# Patient Record
Sex: FEMALE | Race: WHITE | NOT HISPANIC OR LATINO | URBAN - METROPOLITAN AREA
[De-identification: names, ages, dates, MRNs, and addresses within clinical notes are randomized per-mention and may not be internally consistent; named-entity substitution may affect disease eponyms.]

---

## 2020-06-02 ENCOUNTER — TELEPHONE ENCOUNTER (OUTPATIENT)
Dept: URBAN - METROPOLITAN AREA CLINIC 80 | Facility: CLINIC | Age: 40
End: 2020-06-02

## 2020-06-03 ENCOUNTER — LAB OUTSIDE AN ENCOUNTER (OUTPATIENT)
Dept: URBAN - METROPOLITAN AREA TELEHEALTH 2 | Facility: TELEHEALTH | Age: 40
End: 2020-06-03

## 2020-06-03 ENCOUNTER — OFFICE VISIT (OUTPATIENT)
Dept: URBAN - METROPOLITAN AREA TELEHEALTH 2 | Facility: TELEHEALTH | Age: 40
End: 2020-06-03
Payer: MEDICARE

## 2020-06-03 ENCOUNTER — OFFICE VISIT (OUTPATIENT)
Dept: URBAN - METROPOLITAN AREA TELEHEALTH 2 | Facility: TELEHEALTH | Age: 40
End: 2020-06-03

## 2020-06-03 DIAGNOSIS — R10.9 ABDOMINAL PAIN: ICD-10-CM

## 2020-06-03 DIAGNOSIS — K29.70 GASTRITIS: ICD-10-CM

## 2020-06-03 DIAGNOSIS — K27.9 PUD (PEPTIC ULCER DISEASE): ICD-10-CM

## 2020-06-03 DIAGNOSIS — K21.9 GERD: ICD-10-CM

## 2020-06-03 DIAGNOSIS — K59.01 CONSTIPATION: ICD-10-CM

## 2020-06-03 PROCEDURE — G9902 PT SCRN TBCO AND ID AS USER: HCPCS | Performed by: INTERNAL MEDICINE

## 2020-06-03 PROCEDURE — 99214 OFFICE O/P EST MOD 30 MIN: CPT | Performed by: INTERNAL MEDICINE

## 2020-06-03 PROCEDURE — G8427 DOCREV CUR MEDS BY ELIG CLIN: HCPCS | Performed by: INTERNAL MEDICINE

## 2020-06-03 RX ORDER — PROMETHAZINE HYDROCHLORIDE 25 MG/ML
1 ML AS NEEDED INJECTION INTRAMUSCULAR; INTRAVENOUS
Qty: 60 | Refills: 2 | OUTPATIENT
Start: 2020-06-03 | End: 2020-09-01

## 2020-06-03 RX ORDER — ONDANSETRON HYDROCHLORIDE 4 MG/1
TAKE 1 TABLET BY ORAL ROUTE 3 TIMES A DAY FOR 90 DAYS TABLET, FILM COATED ORAL
Qty: 270 | Refills: 0 | Status: ACTIVE | COMMUNITY
Start: 2020-03-25 | End: 2020-06-23

## 2020-06-03 RX ORDER — GABAPENTIN 800 MG/1
TAKE 1 TABLET (800 MG) BY ORAL ROUTE 3 TIMES PER DAY TABLET, FILM COATED ORAL
Qty: 0 | Refills: 0 | Status: ACTIVE | COMMUNITY
Start: 1900-01-01 | End: 1900-01-01

## 2020-06-03 RX ORDER — LUBIPROSTONE 24 UG/1
1 CAPSULE WITH FOOD AND WATER CAPSULE, GELATIN COATED ORAL TWICE A DAY
Qty: 180 CAPSULE | Refills: 3 | OUTPATIENT
Start: 2020-06-03 | End: 2021-05-29

## 2020-06-03 RX ORDER — FAMOTIDINE 20 MG/1
1 TABLET AT BEDTIME AS NEEDED TABLET, FILM COATED ORAL BID PRN
Qty: 60 | Refills: 3 | OUTPATIENT
Start: 2020-06-03

## 2020-06-03 RX ORDER — ONDANSETRON HYDROCHLORIDE 4 MG/1
TAKE 1 TABLET BY MOUTH THREE TIMES A DAY AS NEEDED TABLET, FILM COATED ORAL
Qty: 60 | Refills: 1 | Status: ACTIVE | COMMUNITY
Start: 2018-03-01 | End: 1900-01-01

## 2020-06-03 RX ORDER — QUETIAPINE 50 MG/1
TABLET, FILM COATED ORAL
Qty: 0 | Refills: 0 | Status: ACTIVE | COMMUNITY
Start: 1900-01-01 | End: 1900-01-01

## 2020-06-03 RX ORDER — PANTOPRAZOLE SODIUM 40 MG
TAKE 1 TABLET (40 MG) BY ORAL ROUTE ONCE DAILY FOR 90 DAYS TABLET, DELAYED RELEASE (ENTERIC COATED) ORAL 1
Qty: 90 | Refills: 3 | Status: ACTIVE | COMMUNITY
Start: 2019-08-06 | End: 2020-07-31

## 2020-06-03 RX ORDER — DIAZEPAM 10 MG
TABLET ORAL
Qty: 0 | Refills: 0 | Status: ACTIVE | COMMUNITY
Start: 1900-01-01 | End: 1900-01-01

## 2020-06-03 RX ORDER — DEXLANSOPRAZOLE 60 MG/1
1 CAPSULE CAPSULE, DELAYED RELEASE ORAL ONCE A DAY
Qty: 90 CAPSULE | Refills: 3 | OUTPATIENT
Start: 2020-06-03

## 2020-06-03 NOTE — HPI-TODAY'S VISIT:
More than half of the face-to-face time used for counseling and coordination of care. Patient seen today via telehealth by agreement and consent of patient in light of current COVID-19 pandemic. I used video conferencing during the visit. The patient encounter is appropriate and reasonable under the circumstances given the patient's particular presentation at this time. The patient has been advised of the followin) the potential risks and limitations of this mode of treatment (including but not limited to the absence of in-person examination); 2) the right to refuse telehealth services at any point without affecting the right to future care; 3) the right to receive in-person services, included immediately after this consultation if an urgent need arises; 4) information, including identifiable images or information from this telehealth consult, will only be shared in accordance with HIPPA regulations. Any and all of the patient's and/or patient's family member's questions on this issue have been answered. The patient has verbally consented to be treated via telehealth services. The patient has also been advised to contact this office for worsening conditions or problems, and seek emergency medical treatment and/or call 911 if the patient deems either necessary.  prior hx of gastritis and opioid induced constipation seen in 2019 for gastritis.  started protonix egd in 10/2019 with large food bezoar in the stomach.  repeat egd in 2019 with gastritis but no hp or celiac she notes ongoing symptoms with epigastric burning daily and worse with eating worsening constipation as well this past week, she notes anorexia notes sour taste in her mouth  went to [facility] ER on 3/21/2020 with unremarkable and was given stool softener now on twice daily otc laxative nd having 1 bm/day.  no blood or mucus  stable weight she attributes some of this to stress from coronavirus.   2019:  This is a follow-up appointment for this patient, a 36 year old /White female , after a previous visit on 02/10/2017, for an evaluation for abdominal pain attributed to gastiritis/gerd. last egd 17 with mild gastritis but no ulcers noted. she lost her insurance and stopped the dexilant. she comes back for a few months of periumbilical pain. pain is crampy and post-prandial. she notes symptoms of dysphagia to solids and liquids with regurgitation. mild nausea no vomiting. she is down 10 lb in the past few months but has been dieting. notes no diarrhea but notes symptoms of constipation with straining. no mucus or blood in the stool. pain improves with bm. Prior visit to [facility] 10/2016 with infectious colitis. She had flex with congested mucosa and stool testing + for E.coli jam shiga. she is not taking miralax regularly. She does take chronic narcotics for chronic neck, back and joint pains. she takes percocet, tramadol, and xanaflex. no other complaints.  EGD in 2014 with noted ulcer and patient was placed on protonix and bentyl. egd in  with gastritis but no ulcers. A colonoscopy in 2015 with normal ti/colon biopsies. No new complaints.

## 2020-06-03 NOTE — PHYSICAL EXAM GASTROINTESTINAL
Self Exam: Abdomen soft, tender to palpitation in the periumbilical area, non-distended.  no guarding

## 2020-06-12 ENCOUNTER — OFFICE VISIT (OUTPATIENT)
Dept: URBAN - METROPOLITAN AREA CLINIC 80 | Facility: CLINIC | Age: 40
End: 2020-06-12

## 2020-06-19 ENCOUNTER — ERX REFILL RESPONSE (OUTPATIENT)
Age: 40
End: 2020-06-19

## 2020-06-19 RX ORDER — ONDANSETRON HYDROCHLORIDE 4 MG/1
TAKE 1 TABLET BY MOUTH THREE TIMES DAILY TABLET, FILM COATED ORAL
Qty: 270 | Refills: 0

## 2020-07-17 ENCOUNTER — ERX REFILL RESPONSE (OUTPATIENT)
Age: 40
End: 2020-07-17

## 2020-07-17 RX ORDER — PANTOPRAZOLE SODIUM 40 MG/1
TAKE 1 TABLET (40 MG) BY ORAL ROUTE ONCE DAILY FOR 90 DAYS TABLET, DELAYED RELEASE ORAL
Qty: 90 | Refills: 2

## 2020-07-23 ENCOUNTER — TELEPHONE ENCOUNTER (OUTPATIENT)
Dept: URBAN - METROPOLITAN AREA CLINIC 92 | Facility: CLINIC | Age: 40
End: 2020-07-23

## 2020-07-23 RX ORDER — DEXLANSOPRAZOLE 60 MG/1
1 CAPSULE CAPSULE, DELAYED RELEASE ORAL ONCE A DAY
Qty: 90
Start: 2020-06-03

## 2020-10-16 ENCOUNTER — ERX REFILL RESPONSE (OUTPATIENT)
Age: 40
End: 2020-10-16

## 2020-10-16 RX ORDER — ONDANSETRON HYDROCHLORIDE 4 MG/1
TAKE 1 TABLET BY MOUTH THREE TIMES DAILY TABLET, FILM COATED ORAL
Qty: 270 | Refills: 0

## 2020-11-16 ENCOUNTER — TELEPHONE ENCOUNTER (OUTPATIENT)
Dept: URBAN - METROPOLITAN AREA CLINIC 92 | Facility: CLINIC | Age: 40
End: 2020-11-16

## 2020-11-16 RX ORDER — DEXLANSOPRAZOLE 60 MG/1
1 CAPSULE CAPSULE, DELAYED RELEASE ORAL ONCE A DAY
Qty: 90
Start: 2020-06-03

## 2020-12-07 ENCOUNTER — TELEPHONE ENCOUNTER (OUTPATIENT)
Dept: URBAN - METROPOLITAN AREA CLINIC 92 | Facility: CLINIC | Age: 40
End: 2020-12-07

## 2020-12-07 RX ORDER — DEXLANSOPRAZOLE 60 MG/1
1 CAPSULE CAPSULE, DELAYED RELEASE ORAL ONCE A DAY
Qty: 90
Start: 2020-06-03

## 2020-12-23 ENCOUNTER — TELEPHONE ENCOUNTER (OUTPATIENT)
Dept: URBAN - METROPOLITAN AREA CLINIC 80 | Facility: CLINIC | Age: 40
End: 2020-12-23

## 2020-12-23 RX ORDER — PROMETHAZINE HYDROCHLORIDE 25 MG/1
1 SUPPOSITORY AS NEEDED SUPPOSITORY RECTAL
Qty: 60 | Refills: 2 | OUTPATIENT
Start: 2020-12-28 | End: 2021-03-28

## 2020-12-29 ENCOUNTER — ERX REFILL RESPONSE (OUTPATIENT)
Dept: URBAN - METROPOLITAN AREA CLINIC 13 | Facility: CLINIC | Age: 40
End: 2020-12-29

## 2020-12-29 RX ORDER — POLYETHYLENE GLYCOL 3350 17 G/17G
USE AS DIRECTED BY ORAL ROUTE QD.  TAKE 2 CAPFULS DAILY AND CONSIDER GOING UP AS NEEDED POWDER, FOR SOLUTION ORAL
Qty: 1 | Refills: 4

## 2021-01-07 ENCOUNTER — OFFICE VISIT (OUTPATIENT)
Dept: URBAN - METROPOLITAN AREA TELEHEALTH 2 | Facility: TELEHEALTH | Age: 41
End: 2021-01-07
Payer: MEDICARE

## 2021-01-07 DIAGNOSIS — R11.2 NON-INTRACTABLE VOMITING WITH NAUSEA, UNSPECIFIED VOMITING TYPE: ICD-10-CM

## 2021-01-07 DIAGNOSIS — K59.09 CHRONIC CONSTIPATION: ICD-10-CM

## 2021-01-07 DIAGNOSIS — K59.00 CONSTIPATION, UNSPECIFIED CONSTIPATION TYPE: ICD-10-CM

## 2021-01-07 PROBLEM — 14760008: Status: ACTIVE | Noted: 2021-01-07

## 2021-01-07 PROCEDURE — 99443 PHONE E/M BY PHYS 21-30 MIN: CPT | Performed by: NURSE PRACTITIONER

## 2021-01-07 RX ORDER — POLYETHYLENE GLYCOL 3350 17 G/17G
1 CAPFUL POWDER, FOR SOLUTION ORAL ONCE DAILY
Qty: 1 BOTTLE | Refills: 5 | OUTPATIENT
Start: 2021-01-07 | End: 2021-07-06

## 2021-01-07 RX ORDER — QUETIAPINE 50 MG/1
TABLET, FILM COATED ORAL
Qty: 0 | Refills: 0 | Status: ACTIVE | COMMUNITY
Start: 1900-01-01

## 2021-01-07 RX ORDER — PANTOPRAZOLE SODIUM 40 MG/1
TAKE 1 TABLET (40 MG) BY ORAL ROUTE ONCE DAILY FOR 90 DAYS TABLET, DELAYED RELEASE ORAL
Qty: 90 | Refills: 2 | Status: ACTIVE | COMMUNITY

## 2021-01-07 RX ORDER — PROMETHAZINE HYDROCHLORIDE 25 MG/1
1 SUPPOSITORY AS NEEDED SUPPOSITORY RECTAL
Qty: 60 | Refills: 2 | Status: ACTIVE | COMMUNITY
Start: 2020-12-28 | End: 2021-03-28

## 2021-01-07 RX ORDER — LUBIPROSTONE 24 UG/1
1 CAPSULE WITH FOOD AND WATER CAPSULE, GELATIN COATED ORAL TWICE A DAY
Qty: 180 CAPSULE | Refills: 3 | Status: ACTIVE | COMMUNITY
Start: 2020-06-03 | End: 2021-05-29

## 2021-01-07 RX ORDER — FAMOTIDINE 20 MG/1
1 TABLET AT BEDTIME AS NEEDED TABLET, FILM COATED ORAL BID PRN
Qty: 60 | Refills: 3 | Status: ACTIVE | COMMUNITY
Start: 2020-06-03

## 2021-01-07 RX ORDER — DEXLANSOPRAZOLE 60 MG/1
1 CAPSULE CAPSULE, DELAYED RELEASE ORAL ONCE A DAY
Qty: 90 | Status: ACTIVE | COMMUNITY
Start: 2020-06-03

## 2021-01-07 RX ORDER — DIAZEPAM 10 MG
TABLET ORAL
Qty: 0 | Refills: 0 | Status: ACTIVE | COMMUNITY
Start: 1900-01-01

## 2021-01-07 RX ORDER — POLYETHYLENE GLYCOL 3350 17 G/17G
USE AS DIRECTED BY ORAL ROUTE QD.  TAKE 2 CAPFULS DAILY AND CONSIDER GOING UP AS NEEDED POWDER, FOR SOLUTION ORAL
Qty: 1 | Refills: 4 | Status: ACTIVE | COMMUNITY

## 2021-01-07 RX ORDER — ONDANSETRON HYDROCHLORIDE 4 MG/1
TAKE 1 TABLET BY MOUTH THREE TIMES DAILY TABLET, FILM COATED ORAL
Qty: 270 | Refills: 0 | Status: ACTIVE | COMMUNITY

## 2021-01-07 RX ORDER — GABAPENTIN 800 MG/1
TAKE 1 TABLET (800 MG) BY ORAL ROUTE 3 TIMES PER DAY TABLET, FILM COATED ORAL
Qty: 0 | Refills: 0 | Status: ACTIVE | COMMUNITY
Start: 1900-01-01

## 2021-01-07 NOTE — HPI-TODAY'S VISIT:
last yr multiple ER visits for stomach issues. now very constipated and vomiting. awoke 4am vomited and unable to have BM. so was able to take enema but not very successful. went to ER -CT with extensive constipation. Next sunday went to ER had severe pain. worse when eats. has been able to eat eggs and  smoothie. return to hospital 12/21 and 22. had syncopal episode. has been drinking gatorade.  wonders if she has gastroparesis. has lost 30 pounds in 1 month. uses waler and cane-has difficulty ambulating protonix, bentyl and phenergan suppositories help with symptoms amitiza too effective-caused diarrhea using miralax now-good BM today-has been effective past few days not on pain medication now zanaflex for muscle spasm

## 2021-01-11 ENCOUNTER — OFFICE VISIT (OUTPATIENT)
Dept: URBAN - METROPOLITAN AREA CLINIC 2 | Facility: CLINIC | Age: 41
End: 2021-01-11

## 2021-01-11 ENCOUNTER — ERX REFILL RESPONSE (OUTPATIENT)
Age: 41
End: 2021-01-11

## 2021-01-11 RX ORDER — ONDANSETRON HYDROCHLORIDE 4 MG/1
TAKE 1 TABLET BY MOUTH THREE TIMES DAILY TABLET, FILM COATED ORAL
Qty: 270 | Refills: 0

## 2021-01-12 ENCOUNTER — TELEPHONE ENCOUNTER (OUTPATIENT)
Dept: URBAN - METROPOLITAN AREA CLINIC 2 | Facility: CLINIC | Age: 41
End: 2021-01-12

## 2021-01-19 ENCOUNTER — TELEPHONE ENCOUNTER (OUTPATIENT)
Dept: URBAN - METROPOLITAN AREA CLINIC 92 | Facility: CLINIC | Age: 41
End: 2021-01-19

## 2021-01-19 RX ORDER — GABAPENTIN 800 MG/1
TAKE 1 TABLET (800 MG) BY ORAL ROUTE 3 TIMES PER DAY TABLET, FILM COATED ORAL
Qty: 0 | Refills: 0 | Status: ACTIVE | COMMUNITY
Start: 1900-01-01

## 2021-01-19 RX ORDER — PROMETHAZINE HYDROCHLORIDE 25 MG/1
1 TABLET TABLET ORAL
Qty: 20 TABLET | Refills: 0 | OUTPATIENT
Start: 2021-01-19 | End: 2021-02-18

## 2021-01-19 RX ORDER — PROMETHAZINE HYDROCHLORIDE 25 MG/1
1 SUPPOSITORY AS NEEDED SUPPOSITORY RECTAL
Qty: 60 | Refills: 2 | Status: ACTIVE | COMMUNITY
Start: 2020-12-28 | End: 2021-03-28

## 2021-01-19 RX ORDER — FAMOTIDINE 20 MG/1
1 TABLET AT BEDTIME AS NEEDED TABLET, FILM COATED ORAL BID PRN
Qty: 60 | Refills: 3 | Status: ACTIVE | COMMUNITY
Start: 2020-06-03

## 2021-01-19 RX ORDER — POLYETHYLENE GLYCOL 3350 17 G/17G
USE AS DIRECTED BY ORAL ROUTE QD.  TAKE 2 CAPFULS DAILY AND CONSIDER GOING UP AS NEEDED POWDER, FOR SOLUTION ORAL
Qty: 1 | Refills: 4 | Status: ACTIVE | COMMUNITY

## 2021-01-19 RX ORDER — POLYETHYLENE GLYCOL 3350 17 G/17G
1 CAPFUL POWDER, FOR SOLUTION ORAL ONCE DAILY
Qty: 1 BOTTLE | Refills: 5 | Status: ACTIVE | COMMUNITY
Start: 2021-01-07 | End: 2021-07-06

## 2021-01-19 RX ORDER — DIAZEPAM 10 MG
TABLET ORAL
Qty: 0 | Refills: 0 | Status: ACTIVE | COMMUNITY
Start: 1900-01-01

## 2021-01-19 RX ORDER — PANTOPRAZOLE SODIUM 40 MG/1
TAKE 1 TABLET (40 MG) BY ORAL ROUTE ONCE DAILY FOR 90 DAYS TABLET, DELAYED RELEASE ORAL
Qty: 90 | Refills: 2 | Status: ACTIVE | COMMUNITY

## 2021-01-19 RX ORDER — LUBIPROSTONE 24 UG/1
1 CAPSULE WITH FOOD AND WATER CAPSULE, GELATIN COATED ORAL TWICE A DAY
Qty: 180 CAPSULE | Refills: 3 | Status: ACTIVE | COMMUNITY
Start: 2020-06-03 | End: 2021-05-29

## 2021-01-19 RX ORDER — DEXLANSOPRAZOLE 60 MG/1
1 CAPSULE CAPSULE, DELAYED RELEASE ORAL ONCE A DAY
Qty: 90 | Status: ACTIVE | COMMUNITY
Start: 2020-06-03

## 2021-01-19 RX ORDER — QUETIAPINE 50 MG/1
TABLET, FILM COATED ORAL
Qty: 0 | Refills: 0 | Status: ACTIVE | COMMUNITY
Start: 1900-01-01

## 2021-01-19 RX ORDER — ONDANSETRON HYDROCHLORIDE 4 MG/1
TAKE 1 TABLET BY MOUTH THREE TIMES DAILY TABLET, FILM COATED ORAL
Qty: 270 | Refills: 0 | Status: ACTIVE | COMMUNITY

## 2021-01-21 ENCOUNTER — OFFICE VISIT (OUTPATIENT)
Dept: URBAN - METROPOLITAN AREA CLINIC 128 | Facility: CLINIC | Age: 41
End: 2021-01-21

## 2021-02-15 ENCOUNTER — TELEPHONE ENCOUNTER (OUTPATIENT)
Dept: URBAN - METROPOLITAN AREA CLINIC 92 | Facility: CLINIC | Age: 41
End: 2021-02-15

## 2021-02-15 RX ORDER — METOCLOPRAMIDE HYDROCHLORIDE 5 MG/1
1 TABLET BEFORE MEALS TABLET ORAL
Qty: 180 | Refills: 1 | OUTPATIENT
Start: 2021-02-15

## 2021-02-15 RX ORDER — PROMETHAZINE HYDROCHLORIDE 25 MG/1
1 TABLET TABLET ORAL
Qty: 20 TABLET | Refills: 0
Start: 2021-01-19

## 2021-02-15 RX ORDER — PROMETHAZINE HYDROCHLORIDE 25 MG/1
1 SUPPOSITORY AS NEEDED SUPPOSITORY RECTAL
Qty: 60 | Refills: 2
Start: 2020-12-28

## 2021-02-16 ENCOUNTER — TELEPHONE ENCOUNTER (OUTPATIENT)
Dept: URBAN - METROPOLITAN AREA CLINIC 23 | Facility: CLINIC | Age: 41
End: 2021-02-16

## 2021-02-19 ENCOUNTER — TELEPHONE ENCOUNTER (OUTPATIENT)
Dept: URBAN - METROPOLITAN AREA CLINIC 92 | Facility: CLINIC | Age: 41
End: 2021-02-19

## 2021-03-05 ENCOUNTER — TELEPHONE ENCOUNTER (OUTPATIENT)
Dept: URBAN - METROPOLITAN AREA CLINIC 92 | Facility: CLINIC | Age: 41
End: 2021-03-05

## 2021-03-08 ENCOUNTER — OFFICE VISIT (OUTPATIENT)
Dept: URBAN - METROPOLITAN AREA CLINIC 80 | Facility: CLINIC | Age: 41
End: 2021-03-08
Payer: MEDICARE

## 2021-03-08 ENCOUNTER — WEB ENCOUNTER (OUTPATIENT)
Dept: URBAN - METROPOLITAN AREA CLINIC 80 | Facility: CLINIC | Age: 41
End: 2021-03-08

## 2021-03-08 DIAGNOSIS — K29.70 GASTRITIS: ICD-10-CM

## 2021-03-08 DIAGNOSIS — K21.9 GERD: ICD-10-CM

## 2021-03-08 DIAGNOSIS — K27.9 PUD (PEPTIC ULCER DISEASE): ICD-10-CM

## 2021-03-08 DIAGNOSIS — K59.01 CONSTIPATION: ICD-10-CM

## 2021-03-08 DIAGNOSIS — R10.9 ABDOMINAL PAIN: ICD-10-CM

## 2021-03-08 DIAGNOSIS — K31.84 GASTROPARESIS: ICD-10-CM

## 2021-03-08 PROCEDURE — 99214 OFFICE O/P EST MOD 30 MIN: CPT | Performed by: INTERNAL MEDICINE

## 2021-03-08 RX ORDER — LUBIPROSTONE 24 UG/1
1 CAPSULE WITH FOOD AND WATER CAPSULE, GELATIN COATED ORAL TWICE A DAY
Qty: 180 CAPSULE | Refills: 3 | Status: ACTIVE | COMMUNITY
Start: 2020-06-03 | End: 2021-05-29

## 2021-03-08 RX ORDER — PROMETHAZINE HYDROCHLORIDE 25 MG/1
1 TABLET TABLET ORAL
Qty: 20 TABLET | Refills: 0 | Status: ACTIVE | COMMUNITY
Start: 2021-01-19

## 2021-03-08 RX ORDER — DEXLANSOPRAZOLE 60 MG/1
1 CAPSULE CAPSULE, DELAYED RELEASE ORAL ONCE A DAY
Qty: 90 | Status: ACTIVE | COMMUNITY
Start: 2020-06-03

## 2021-03-08 RX ORDER — QUETIAPINE 50 MG/1
TABLET, FILM COATED ORAL
Qty: 0 | Refills: 0 | Status: ACTIVE | COMMUNITY
Start: 1900-01-01

## 2021-03-08 RX ORDER — METOCLOPRAMIDE HYDROCHLORIDE 5 MG/1
1 TABLET BEFORE MEALS TABLET ORAL
Qty: 180 | Refills: 1 | Status: ACTIVE | COMMUNITY
Start: 2021-02-15

## 2021-03-08 RX ORDER — POLYETHYLENE GLYCOL 3350 17 G/17G
USE AS DIRECTED BY ORAL ROUTE QD.  TAKE 2 CAPFULS DAILY AND CONSIDER GOING UP AS NEEDED POWDER, FOR SOLUTION ORAL
Qty: 1 | Refills: 4 | Status: ACTIVE | COMMUNITY

## 2021-03-08 RX ORDER — ONDANSETRON HYDROCHLORIDE 4 MG/1
TAKE 1 TABLET BY MOUTH THREE TIMES DAILY TABLET, FILM COATED ORAL
Qty: 270 | Refills: 0 | Status: ACTIVE | COMMUNITY

## 2021-03-08 RX ORDER — PROMETHAZINE HYDROCHLORIDE 25 MG/1
1 SUPPOSITORY AS NEEDED SUPPOSITORY RECTAL
Qty: 60 | Refills: 2 | Status: ACTIVE | COMMUNITY
Start: 2020-12-28

## 2021-03-08 RX ORDER — PANTOPRAZOLE SODIUM 40 MG/1
TAKE 1 TABLET (40 MG) BY ORAL ROUTE ONCE DAILY FOR 90 DAYS TABLET, DELAYED RELEASE ORAL
Qty: 90 | Refills: 2 | Status: ACTIVE | COMMUNITY

## 2021-03-08 RX ORDER — FAMOTIDINE 20 MG/1
1 TABLET AT BEDTIME AS NEEDED TABLET, FILM COATED ORAL BID PRN
Qty: 60 | Refills: 3 | Status: ACTIVE | COMMUNITY
Start: 2020-06-03

## 2021-03-08 RX ORDER — DIAZEPAM 10 MG
TABLET ORAL
Qty: 0 | Refills: 0 | Status: ACTIVE | COMMUNITY
Start: 1900-01-01

## 2021-03-08 RX ORDER — POLYETHYLENE GLYCOL 3350 17 G/17G
1 CAPFUL POWDER, FOR SOLUTION ORAL ONCE DAILY
Qty: 1 BOTTLE | Refills: 5 | Status: ACTIVE | COMMUNITY
Start: 2021-01-07 | End: 2021-07-06

## 2021-03-08 RX ORDER — GABAPENTIN 800 MG/1
TAKE 1 TABLET (800 MG) BY ORAL ROUTE 3 TIMES PER DAY TABLET, FILM COATED ORAL
Qty: 0 | Refills: 0 | Status: ACTIVE | COMMUNITY
Start: 1900-01-01

## 2021-03-08 NOTE — HPI-TODAY'S VISIT:
She is presenting today with gastroparesis associated with nausea and vomiting. She has been unable to tolerate solid food. She has been able to tolerated liquids somewhat better. Her diet when able to eat  consistes of fries, hashbrowns. She has been reluctant to try reglan and other medications for her gastroparesis due to side effects like HTN, and tardive dyskinesia. She started having burning abdominal pain at 2 am that somewhat improved with carafate and has had intermittent mild burning since. She also admits to abdominal distension today. States she has had unintential weight loss due to being unable to eat, though per our records, she is up 14 lb from last visit in 1/2021.    constipatino well controlled on miralax feels overwhelmed and tearful in the office wants palliative care  prior hx of gastritis and opioid induced constipation egd in 10/2019 with large food bezoar in the stomach.  repeat egd in 12/2019 with gastritis but no hp or celiac went to [facility] ER on 3/21/2020 with unremarkable and was given stool softener  8/2019:  This is a follow-up appointment for this patient, a 36 year old /White female , after a previous visit on 02/10/2017, for an evaluation for abdominal pain attributed to gastiritis/gerd. last egd 2/28/17 with mild gastritis but no ulcers noted. she lost her insurance and stopped the dexilant. she comes back for a few months of periumbilical pain. pain is crampy and post-prandial. she notes symptoms of dysphagia to solids and liquids with regurgitation. mild nausea no vomiting. she is down 10 lb in the past few months but has been dieting. notes no diarrhea but notes symptoms of constipation with straining. no mucus or blood in the stool. pain improves with bm. Prior visit to [facility] 10/2016 with infectious colitis. She had flex with congested mucosa and stool testing + for E.coli jam shiga. she is not taking miralax regularly. She does take chronic narcotics for chronic neck, back and joint pains. she takes percocet, tramadol, and xanaflex. no other complaints.  EGD in 5/2014 with noted ulcer and patient was placed on protonix and bentyl. egd in 2017 with gastritis but no ulcers. A colonoscopy in 12/2015 with normal ti/colon biopsies. No new complaints.

## 2021-03-15 ENCOUNTER — TELEPHONE ENCOUNTER (OUTPATIENT)
Dept: URBAN - METROPOLITAN AREA CLINIC 92 | Facility: CLINIC | Age: 41
End: 2021-03-15

## 2021-03-19 ENCOUNTER — OFFICE VISIT (OUTPATIENT)
Dept: URBAN - METROPOLITAN AREA CLINIC 80 | Facility: CLINIC | Age: 41
End: 2021-03-19

## 2021-04-08 ENCOUNTER — ERX REFILL RESPONSE (OUTPATIENT)
Dept: URBAN - METROPOLITAN AREA CLINIC 13 | Facility: CLINIC | Age: 41
End: 2021-04-08

## 2021-04-08 ENCOUNTER — OUT OF OFFICE VISIT (OUTPATIENT)
Dept: URBAN - METROPOLITAN AREA MEDICAL CENTER 18 | Facility: MEDICAL CENTER | Age: 41
End: 2021-04-08
Payer: MEDICARE

## 2021-04-08 ENCOUNTER — TELEPHONE ENCOUNTER (OUTPATIENT)
Dept: URBAN - METROPOLITAN AREA CLINIC 92 | Facility: CLINIC | Age: 41
End: 2021-04-08

## 2021-04-08 DIAGNOSIS — K31.84 DIABETIC GASTROPARESIS: ICD-10-CM

## 2021-04-08 DIAGNOSIS — K59.09 CHRONIC CONSTIPATION: ICD-10-CM

## 2021-04-08 PROCEDURE — G8427 DOCREV CUR MEDS BY ELIG CLIN: HCPCS | Performed by: INTERNAL MEDICINE

## 2021-04-08 PROCEDURE — 99232 SBSQ HOSP IP/OBS MODERATE 35: CPT | Performed by: INTERNAL MEDICINE

## 2021-04-08 PROCEDURE — 99222 1ST HOSP IP/OBS MODERATE 55: CPT | Performed by: INTERNAL MEDICINE

## 2021-04-08 RX ORDER — PANTOPRAZOLE SODIUM 40 MG/1
TAKE 1 TABLET (40 MG) BY ORAL ROUTE ONCE DAILY FOR 90 DAYS TABLET, DELAYED RELEASE ORAL
Qty: 90 | Refills: 2

## 2021-04-09 ENCOUNTER — OUT OF OFFICE VISIT (OUTPATIENT)
Dept: URBAN - METROPOLITAN AREA MEDICAL CENTER 18 | Facility: MEDICAL CENTER | Age: 41
End: 2021-04-09
Payer: MEDICARE

## 2021-04-09 DIAGNOSIS — R74.01 ALT (SGPT) LEVEL RAISED: ICD-10-CM

## 2021-04-09 DIAGNOSIS — K31.84 DIABETIC GASTROPARESIS: ICD-10-CM

## 2021-04-09 DIAGNOSIS — R11.2 ACUTE NAUSEA WITH NONBILIOUS VOMITING: ICD-10-CM

## 2021-04-09 PROCEDURE — 99232 SBSQ HOSP IP/OBS MODERATE 35: CPT | Performed by: PHYSICIAN ASSISTANT

## 2021-04-12 ENCOUNTER — OUT OF OFFICE VISIT (OUTPATIENT)
Dept: URBAN - METROPOLITAN AREA MEDICAL CENTER 18 | Facility: MEDICAL CENTER | Age: 41
End: 2021-04-12
Payer: MEDICARE

## 2021-04-12 DIAGNOSIS — R74.01 ALT (SGPT) LEVEL RAISED: ICD-10-CM

## 2021-04-12 DIAGNOSIS — R11.0 CHRONIC NAUSEA: ICD-10-CM

## 2021-04-12 PROCEDURE — 99232 SBSQ HOSP IP/OBS MODERATE 35: CPT | Performed by: INTERNAL MEDICINE

## 2021-04-22 ENCOUNTER — TELEPHONE ENCOUNTER (OUTPATIENT)
Dept: URBAN - METROPOLITAN AREA CLINIC 92 | Facility: CLINIC | Age: 41
End: 2021-04-22

## 2021-04-22 RX ORDER — PROMETHAZINE HYDROCHLORIDE 25 MG/1
1 TABLET TABLET ORAL
Qty: 20 TABLET | Refills: 0 | Status: ACTIVE | COMMUNITY
Start: 2021-01-19

## 2021-04-22 RX ORDER — FAMOTIDINE 20 MG/1
1 TABLET AT BEDTIME AS NEEDED TABLET, FILM COATED ORAL BID PRN
Qty: 60 | Refills: 3 | Status: ACTIVE | COMMUNITY
Start: 2020-06-03

## 2021-04-22 RX ORDER — QUETIAPINE 50 MG/1
TABLET, FILM COATED ORAL
Qty: 0 | Refills: 0 | Status: ACTIVE | COMMUNITY
Start: 1900-01-01

## 2021-04-22 RX ORDER — PROMETHAZINE HYDROCHLORIDE 25 MG/1
1 TABLET AS NEEDED TABLET ORAL
Qty: 60 TABLET | Refills: 1 | OUTPATIENT
End: 2021-06-21

## 2021-04-22 RX ORDER — DEXLANSOPRAZOLE 60 MG/1
1 CAPSULE CAPSULE, DELAYED RELEASE ORAL ONCE A DAY
Qty: 90 | Status: ACTIVE | COMMUNITY
Start: 2020-06-03

## 2021-04-22 RX ORDER — PANTOPRAZOLE SODIUM 40 MG/1
TAKE 1 TABLET (40 MG) BY ORAL ROUTE ONCE DAILY FOR 90 DAYS TABLET, DELAYED RELEASE ORAL
Qty: 90 | Refills: 2 | Status: ACTIVE | COMMUNITY

## 2021-04-22 RX ORDER — LUBIPROSTONE 24 UG/1
1 CAPSULE WITH FOOD AND WATER CAPSULE, GELATIN COATED ORAL TWICE A DAY
Qty: 180 CAPSULE | Refills: 3 | Status: ACTIVE | COMMUNITY
Start: 2020-06-03 | End: 2021-05-29

## 2021-04-22 RX ORDER — METOCLOPRAMIDE HYDROCHLORIDE 5 MG/1
1 TABLET BEFORE MEALS TABLET ORAL
Qty: 180 | Refills: 1 | Status: ACTIVE | COMMUNITY
Start: 2021-02-15

## 2021-04-22 RX ORDER — POLYETHYLENE GLYCOL 3350 17 G/17G
USE AS DIRECTED BY ORAL ROUTE QD.  TAKE 2 CAPFULS DAILY AND CONSIDER GOING UP AS NEEDED POWDER, FOR SOLUTION ORAL
Qty: 1 | Refills: 4 | Status: ACTIVE | COMMUNITY

## 2021-04-22 RX ORDER — DIAZEPAM 10 MG
TABLET ORAL
Qty: 0 | Refills: 0 | Status: ACTIVE | COMMUNITY
Start: 1900-01-01

## 2021-04-22 RX ORDER — GABAPENTIN 800 MG/1
TAKE 1 TABLET (800 MG) BY ORAL ROUTE 3 TIMES PER DAY TABLET, FILM COATED ORAL
Qty: 0 | Refills: 0 | Status: ACTIVE | COMMUNITY
Start: 1900-01-01

## 2021-04-22 RX ORDER — PROMETHAZINE HYDROCHLORIDE 25 MG/1
1 SUPPOSITORY AS NEEDED SUPPOSITORY RECTAL
Qty: 60 | Refills: 2 | Status: ACTIVE | COMMUNITY
Start: 2020-12-28

## 2021-04-22 RX ORDER — POLYETHYLENE GLYCOL 3350 17 G/17G
1 CAPFUL POWDER, FOR SOLUTION ORAL ONCE DAILY
Qty: 1 BOTTLE | Refills: 5 | Status: ACTIVE | COMMUNITY
Start: 2021-01-07 | End: 2021-07-06

## 2021-04-22 RX ORDER — ONDANSETRON HYDROCHLORIDE 4 MG/1
TAKE 1 TABLET BY MOUTH THREE TIMES DAILY TABLET, FILM COATED ORAL
Qty: 270 | Refills: 0 | Status: ACTIVE | COMMUNITY

## 2021-05-07 ENCOUNTER — OFFICE VISIT (OUTPATIENT)
Dept: URBAN - METROPOLITAN AREA CLINIC 80 | Facility: CLINIC | Age: 41
End: 2021-05-07

## 2021-05-10 ENCOUNTER — OFFICE VISIT (OUTPATIENT)
Dept: URBAN - METROPOLITAN AREA CLINIC 80 | Facility: CLINIC | Age: 41
End: 2021-05-10

## 2021-05-28 ENCOUNTER — OFFICE VISIT (OUTPATIENT)
Dept: URBAN - METROPOLITAN AREA CLINIC 80 | Facility: CLINIC | Age: 41
End: 2021-05-28

## 2021-06-03 ENCOUNTER — TELEPHONE ENCOUNTER (OUTPATIENT)
Dept: URBAN - METROPOLITAN AREA CLINIC 80 | Facility: CLINIC | Age: 41
End: 2021-06-03

## 2021-06-25 ENCOUNTER — TELEPHONE ENCOUNTER (OUTPATIENT)
Dept: URBAN - METROPOLITAN AREA CLINIC 80 | Facility: CLINIC | Age: 41
End: 2021-06-25

## 2021-06-28 ENCOUNTER — OFFICE VISIT (OUTPATIENT)
Dept: URBAN - METROPOLITAN AREA CLINIC 80 | Facility: CLINIC | Age: 41
End: 2021-06-28
Payer: MEDICARE

## 2021-06-28 VITALS
HEART RATE: 116 BPM | TEMPERATURE: 97.4 F | BODY MASS INDEX: 33.07 KG/M2 | SYSTOLIC BLOOD PRESSURE: 144 MMHG | DIASTOLIC BLOOD PRESSURE: 112 MMHG | HEIGHT: 70 IN | WEIGHT: 231 LBS

## 2021-06-28 DIAGNOSIS — R19.7 DIARRHEA, UNSPECIFIED TYPE: ICD-10-CM

## 2021-06-28 DIAGNOSIS — Z90.49 S/P APPENDECTOMY: ICD-10-CM

## 2021-06-28 DIAGNOSIS — K59.01 CONSTIPATION: ICD-10-CM

## 2021-06-28 DIAGNOSIS — K21.9 GERD: ICD-10-CM

## 2021-06-28 DIAGNOSIS — K29.70 GASTRITIS: ICD-10-CM

## 2021-06-28 DIAGNOSIS — K27.9 PUD (PEPTIC ULCER DISEASE): ICD-10-CM

## 2021-06-28 DIAGNOSIS — K31.84 GASTROPARESIS: ICD-10-CM

## 2021-06-28 DIAGNOSIS — R10.9 ABDOMINAL PAIN: ICD-10-CM

## 2021-06-28 PROCEDURE — 99214 OFFICE O/P EST MOD 30 MIN: CPT | Performed by: INTERNAL MEDICINE

## 2021-06-28 RX ORDER — POLYETHYLENE GLYCOL 3350 17 G/17G
USE AS DIRECTED BY ORAL ROUTE QD.  TAKE 2 CAPFULS DAILY AND CONSIDER GOING UP AS NEEDED POWDER, FOR SOLUTION ORAL
Qty: 1 | Refills: 4 | Status: ACTIVE | COMMUNITY

## 2021-06-28 RX ORDER — DEXLANSOPRAZOLE 60 MG/1
1 CAPSULE CAPSULE, DELAYED RELEASE ORAL ONCE A DAY
Qty: 90 | Status: ACTIVE | COMMUNITY
Start: 2020-06-03

## 2021-06-28 RX ORDER — PROMETHAZINE HYDROCHLORIDE 25 MG/1
1 TABLET TABLET ORAL
Qty: 20 TABLET | Refills: 0 | Status: ACTIVE | COMMUNITY
Start: 2021-01-19

## 2021-06-28 RX ORDER — METOCLOPRAMIDE HYDROCHLORIDE 5 MG/1
1 TABLET BEFORE MEALS TABLET ORAL
Qty: 180 | Refills: 1 | Status: ACTIVE | COMMUNITY
Start: 2021-02-15

## 2021-06-28 RX ORDER — QUETIAPINE 50 MG/1
TABLET, FILM COATED ORAL
Qty: 0 | Refills: 0 | Status: ACTIVE | COMMUNITY
Start: 1900-01-01

## 2021-06-28 RX ORDER — FAMOTIDINE 20 MG/1
1 TABLET AT BEDTIME AS NEEDED TABLET, FILM COATED ORAL BID PRN
Qty: 60 | Refills: 3 | Status: ACTIVE | COMMUNITY
Start: 2020-06-03

## 2021-06-28 RX ORDER — ONDANSETRON HYDROCHLORIDE 4 MG/1
TAKE 1 TABLET BY MOUTH THREE TIMES DAILY TABLET, FILM COATED ORAL
Qty: 270 | Refills: 0 | Status: ACTIVE | COMMUNITY

## 2021-06-28 RX ORDER — PROMETHAZINE HYDROCHLORIDE 25 MG/1
1 SUPPOSITORY AS NEEDED SUPPOSITORY RECTAL
Qty: 60 | Refills: 2 | Status: ACTIVE | COMMUNITY
Start: 2020-12-28

## 2021-06-28 RX ORDER — POLYETHYLENE GLYCOL 3350 17 G/17G
1 CAPFUL POWDER, FOR SOLUTION ORAL ONCE DAILY
Qty: 1 BOTTLE | Refills: 5 | Status: ACTIVE | COMMUNITY
Start: 2021-01-07 | End: 2021-07-06

## 2021-06-28 RX ORDER — GABAPENTIN 800 MG/1
TAKE 1 TABLET (800 MG) BY ORAL ROUTE 3 TIMES PER DAY TABLET, FILM COATED ORAL
Qty: 0 | Refills: 0 | Status: ACTIVE | COMMUNITY
Start: 1900-01-01

## 2021-06-28 RX ORDER — DIAZEPAM 10 MG
TABLET ORAL
Qty: 0 | Refills: 0 | Status: ACTIVE | COMMUNITY
Start: 1900-01-01

## 2021-06-28 RX ORDER — PANTOPRAZOLE SODIUM 40 MG/1
TAKE 1 TABLET (40 MG) BY ORAL ROUTE ONCE DAILY FOR 90 DAYS TABLET, DELAYED RELEASE ORAL
Qty: 90 | Refills: 2 | Status: ACTIVE | COMMUNITY

## 2021-06-28 RX ORDER — PROMETHAZINE HYDROCHLORIDE 25 MG/1
1 TABLET TABLET ORAL
Qty: 60 TABLET | Refills: 1
Start: 2021-01-19 | End: 2021-08-27

## 2021-06-28 NOTE — PHYSICAL EXAM CONSTITUTIONAL:
well developed, well nourished , in no acute distress , ambulating with cane, normal communication ability

## 2021-08-19 ENCOUNTER — ERX REFILL RESPONSE (OUTPATIENT)
Dept: URBAN - METROPOLITAN AREA CLINIC 80 | Facility: CLINIC | Age: 41
End: 2021-08-19

## 2021-08-19 RX ORDER — PROMETHAZINE HYDROCHLORIDE 25 MG/1
1 TABLET TABLET ORAL
Qty: 60 TABLET | Refills: 1 | OUTPATIENT

## 2021-08-19 RX ORDER — PROMETHAZINE HYDROCHLORIDE 25 MG/1
TAKE ONE TABLET BY MOUTH EVERY 6 HOURS AS NEEDED FOR NAUSEA TABLET ORAL
Qty: 60 TABLET | Refills: 2 | OUTPATIENT

## 2021-09-20 ENCOUNTER — OFFICE VISIT (OUTPATIENT)
Dept: URBAN - METROPOLITAN AREA CLINIC 80 | Facility: CLINIC | Age: 41
End: 2021-09-20

## 2021-09-20 RX ORDER — GABAPENTIN 800 MG/1
TAKE 1 TABLET (800 MG) BY ORAL ROUTE 3 TIMES PER DAY TABLET, FILM COATED ORAL
Qty: 0 | Refills: 0 | Status: ACTIVE | COMMUNITY
Start: 1900-01-01

## 2021-09-20 RX ORDER — PROMETHAZINE HYDROCHLORIDE 25 MG/1
TAKE ONE TABLET BY MOUTH EVERY 6 HOURS AS NEEDED FOR NAUSEA TABLET ORAL
Qty: 60 TABLET | Refills: 2 | Status: ACTIVE | COMMUNITY

## 2021-09-20 RX ORDER — POLYETHYLENE GLYCOL 3350 17 G/17G
USE AS DIRECTED BY ORAL ROUTE QD.  TAKE 2 CAPFULS DAILY AND CONSIDER GOING UP AS NEEDED POWDER, FOR SOLUTION ORAL
Qty: 1 | Refills: 4 | Status: ACTIVE | COMMUNITY

## 2021-09-20 RX ORDER — DIAZEPAM 10 MG
TABLET ORAL
Qty: 0 | Refills: 0 | Status: ACTIVE | COMMUNITY
Start: 1900-01-01

## 2021-09-20 RX ORDER — METOCLOPRAMIDE HYDROCHLORIDE 5 MG/1
1 TABLET BEFORE MEALS TABLET ORAL
Qty: 180 | Refills: 1 | Status: ACTIVE | COMMUNITY
Start: 2021-02-15

## 2021-09-20 RX ORDER — ONDANSETRON HYDROCHLORIDE 4 MG/1
TAKE 1 TABLET BY MOUTH THREE TIMES DAILY TABLET, FILM COATED ORAL
Qty: 270 | Refills: 0 | Status: ACTIVE | COMMUNITY

## 2021-09-20 RX ORDER — PROMETHAZINE HYDROCHLORIDE 25 MG/1
1 SUPPOSITORY AS NEEDED SUPPOSITORY RECTAL
Qty: 60 | Refills: 2 | Status: ACTIVE | COMMUNITY
Start: 2020-12-28

## 2021-09-20 RX ORDER — PANTOPRAZOLE SODIUM 40 MG/1
TAKE 1 TABLET (40 MG) BY ORAL ROUTE ONCE DAILY FOR 90 DAYS TABLET, DELAYED RELEASE ORAL
Qty: 90 | Refills: 2 | Status: ACTIVE | COMMUNITY

## 2021-09-20 RX ORDER — FAMOTIDINE 20 MG/1
1 TABLET AT BEDTIME AS NEEDED TABLET, FILM COATED ORAL BID PRN
Qty: 60 | Refills: 3 | Status: ACTIVE | COMMUNITY
Start: 2020-06-03

## 2021-09-20 RX ORDER — DEXLANSOPRAZOLE 60 MG/1
1 CAPSULE CAPSULE, DELAYED RELEASE ORAL ONCE A DAY
Qty: 90 | Status: ACTIVE | COMMUNITY
Start: 2020-06-03

## 2021-09-20 RX ORDER — QUETIAPINE 50 MG/1
TABLET, FILM COATED ORAL
Qty: 0 | Refills: 0 | Status: ACTIVE | COMMUNITY
Start: 1900-01-01

## 2021-10-01 ENCOUNTER — ERX REFILL RESPONSE (OUTPATIENT)
Dept: URBAN - METROPOLITAN AREA CLINIC 80 | Facility: CLINIC | Age: 41
End: 2021-10-01

## 2021-10-01 RX ORDER — PANTOPRAZOLE SODIUM 40 MG/1
TAKE ONE TABLET BY MOUTH DAILY TABLET, DELAYED RELEASE ORAL
Qty: 90 TABLET | Refills: 1 | OUTPATIENT

## 2021-10-01 RX ORDER — PANTOPRAZOLE SODIUM 40 MG/1
TAKE ONE TABLET BY MOUTH DAILY TABLET, DELAYED RELEASE ORAL
Qty: 90 TABLET | Refills: 0 | OUTPATIENT

## 2021-11-08 ENCOUNTER — ERX REFILL RESPONSE (OUTPATIENT)
Dept: URBAN - METROPOLITAN AREA CLINIC 80 | Facility: CLINIC | Age: 41
End: 2021-11-08

## 2021-11-08 RX ORDER — PROMETHAZINE HYDROCHLORIDE 25 MG/1
TAKE ONE TABLET BY MOUTH EVERY 6 HOURS AS NEEDED FOR NAUSEA TABLET ORAL
Qty: 60 TABLET | Refills: 3 | OUTPATIENT

## 2021-11-08 RX ORDER — PROMETHAZINE HYDROCHLORIDE 25 MG/1
TAKE ONE TABLET BY MOUTH EVERY 6 HOURS AS NEEDED FOR NAUSEA TABLET ORAL
Qty: 60 TABLET | Refills: 2 | OUTPATIENT

## 2021-11-12 ENCOUNTER — OFFICE VISIT (OUTPATIENT)
Dept: URBAN - METROPOLITAN AREA CLINIC 80 | Facility: CLINIC | Age: 41
End: 2021-11-12
Payer: MEDICARE

## 2021-11-12 VITALS
SYSTOLIC BLOOD PRESSURE: 132 MMHG | HEIGHT: 70 IN | BODY MASS INDEX: 28.35 KG/M2 | TEMPERATURE: 97.9 F | HEART RATE: 90 BPM | WEIGHT: 198 LBS | DIASTOLIC BLOOD PRESSURE: 91 MMHG

## 2021-11-12 DIAGNOSIS — K21.9 GERD: ICD-10-CM

## 2021-11-12 DIAGNOSIS — R19.7 DIARRHEA, UNSPECIFIED TYPE: ICD-10-CM

## 2021-11-12 DIAGNOSIS — K31.84 GASTROPARESIS: ICD-10-CM

## 2021-11-12 DIAGNOSIS — K59.01 CONSTIPATION: ICD-10-CM

## 2021-11-12 DIAGNOSIS — K29.70 GASTRITIS: ICD-10-CM

## 2021-11-12 DIAGNOSIS — K27.9 PUD (PEPTIC ULCER DISEASE): ICD-10-CM

## 2021-11-12 DIAGNOSIS — R10.9 ABDOMINAL PAIN: ICD-10-CM

## 2021-11-12 DIAGNOSIS — Z90.49 S/P APPENDECTOMY: ICD-10-CM

## 2021-11-12 PROCEDURE — 99214 OFFICE O/P EST MOD 30 MIN: CPT | Performed by: INTERNAL MEDICINE

## 2021-11-12 RX ORDER — ONDANSETRON HYDROCHLORIDE 4 MG/1
TAKE 1 TABLET BY MOUTH THREE TIMES DAILY TABLET, FILM COATED ORAL
Qty: 270 | Refills: 0 | Status: ACTIVE | COMMUNITY

## 2021-11-12 RX ORDER — DIAZEPAM 10 MG
TABLET ORAL
Qty: 0 | Refills: 0 | Status: ACTIVE | COMMUNITY
Start: 1900-01-01

## 2021-11-12 RX ORDER — PROMETHAZINE HYDROCHLORIDE 25 MG/1
1 SUPPOSITORY AS NEEDED SUPPOSITORY RECTAL
Qty: 60 | Refills: 2 | Status: ACTIVE | COMMUNITY
Start: 2020-12-28

## 2021-11-12 RX ORDER — METOCLOPRAMIDE HYDROCHLORIDE 5 MG/1
1 TABLET BEFORE MEALS TABLET ORAL
Qty: 180 | Refills: 1 | Status: ACTIVE | COMMUNITY
Start: 2021-02-15

## 2021-11-12 RX ORDER — PANTOPRAZOLE SODIUM 40 MG/1
TAKE ONE TABLET BY MOUTH DAILY TABLET, DELAYED RELEASE ORAL
Qty: 90 TABLET | Refills: 1 | Status: ACTIVE | COMMUNITY

## 2021-11-12 RX ORDER — PANTOPRAZOLE SODIUM 40 MG/1
TAKE 1 TABLET (40 MG) BY ORAL ROUTE ONCE DAILY FOR 90 DAYS TABLET, DELAYED RELEASE ORAL
Qty: 90 | Refills: 2 | Status: ACTIVE | COMMUNITY

## 2021-11-12 RX ORDER — DEXLANSOPRAZOLE 60 MG/1
1 CAPSULE CAPSULE, DELAYED RELEASE ORAL ONCE A DAY
Qty: 90 | Status: ACTIVE | COMMUNITY
Start: 2020-06-03

## 2021-11-12 RX ORDER — POLYETHYLENE GLYCOL 3350 17 G/17G
USE AS DIRECTED BY ORAL ROUTE QD.  TAKE 2 CAPFULS DAILY AND CONSIDER GOING UP AS NEEDED POWDER, FOR SOLUTION ORAL
Qty: 1 | Refills: 4 | Status: ACTIVE | COMMUNITY

## 2021-11-12 RX ORDER — FAMOTIDINE 20 MG/1
1 TABLET AT BEDTIME AS NEEDED TABLET, FILM COATED ORAL BID PRN
Qty: 60 | Refills: 3 | Status: ACTIVE | COMMUNITY
Start: 2020-06-03

## 2021-11-12 RX ORDER — PROMETHAZINE HYDROCHLORIDE 25 MG/1
TAKE ONE TABLET BY MOUTH EVERY 6 HOURS AS NEEDED FOR NAUSEA TABLET ORAL
Qty: 60 TABLET | Refills: 2 | Status: ACTIVE | COMMUNITY

## 2021-11-12 RX ORDER — QUETIAPINE 50 MG/1
TABLET, FILM COATED ORAL
Qty: 0 | Refills: 0 | Status: ACTIVE | COMMUNITY
Start: 1900-01-01

## 2021-11-12 RX ORDER — GABAPENTIN 800 MG/1
TAKE 1 TABLET (800 MG) BY ORAL ROUTE 3 TIMES PER DAY TABLET, FILM COATED ORAL
Qty: 0 | Refills: 0 | Status: ACTIVE | COMMUNITY
Start: 1900-01-01

## 2021-11-12 NOTE — PHYSICAL EXAM GASTROINTESTINAL
Abdomen , soft, nontender, nondistended , no guarding or rigidity , no masses palpable , normal bowel sounds , PEG site clear and dry.  Liver and Spleen , no hepatomegaly present , no hepatosplenomegaly , liver nontender , spleen not palpable

## 2021-11-12 NOTE — HPI-TODAY'S VISIT:
pt comes in for follow up she is much improved since starting meditation now notes no abd pain she is tolerating high level of tube feeds and eating one meal per day she is down 30 lb from last visit but admits to increase in exercising she is up to 80cc/hr of the higher calories feeds stools are normal with no constipation on magnesium no overt gi bleed good energy  no other complaints  6/2021 She is presenting today with gastroparesis associated with nausea and vomiting. last seen in 3/2021  had peg/j placed in 4/2021  improved until 2 weeks ago when she went to the hospital for appendicitis had appendectomy with drain placed she comes in today worsening symptoms of nausea was tolerating j tube feeds at 45 cc/hr has had to decrease it in the past 2 weeks after her appendectomy assoc abd paini assoc night sweats and nausea notes loose stools with diarrhea was on pain meds for her appendectomy but ran out now.    prior hx of gastroparesis.   She has been reluctant to try reglan and other medications for her gastroparesis due to side effects like HTN, and tardive dyskinesia.  now with PEG/J for venting and feeding nothing by mouth  prior constipation well controlled on miralax now off meds due to diarrhea  prior hx of gastritis and opioid induced constipation egd in 10/2019 with large food bezoar in the stomach.  repeat egd in 12/2019 with gastritis but no hp or celiac went to [facility] ER on 3/21/2020 with unremarkable and was given stool softener EGD in 5/2014 with noted ulcer and patient was placed on protonix and bentyl. egd in 2017 with gastritis but no ulcers. A colonoscopy in 12/2015 with normal ti/colon biopsies. No new complaints.

## 2021-11-13 LAB
A/G RATIO: 1.8
ALBUMIN: 5.7
ALKALINE PHOSPHATASE: 91
ALT (SGPT): 12
AST (SGOT): 17
BILIRUBIN, TOTAL: <0.2
BUN/CREATININE RATIO: 17
BUN: 16
CALCIUM: 10.6
CARBON DIOXIDE, TOTAL: 23
CHLORIDE: 99
CREATININE: 0.93
EGFR IF AFRICN AM: 90
EGFR IF NONAFRICN AM: 78
GLOBULIN, TOTAL: 3.1
GLUCOSE: 91
HEMATOCRIT: 48.8
HEMOGLOBIN: 16
MCH: 29.8
MCHC: 32.8
MCV: 91
NRBC: (no result)
PLATELETS: 548
POTASSIUM: 4.5
PREALBUMIN: 40
PROTEIN, TOTAL: 8.8
RBC: 5.37
RDW: 12.1
SODIUM: 139
WBC: 13.6

## 2021-12-13 ENCOUNTER — OFFICE VISIT (OUTPATIENT)
Dept: URBAN - METROPOLITAN AREA CLINIC 80 | Facility: CLINIC | Age: 41
End: 2021-12-13
Payer: MEDICARE

## 2021-12-13 VITALS
WEIGHT: 193.6 LBS | HEART RATE: 84 BPM | DIASTOLIC BLOOD PRESSURE: 81 MMHG | HEIGHT: 70 IN | BODY MASS INDEX: 27.72 KG/M2 | TEMPERATURE: 98.1 F | SYSTOLIC BLOOD PRESSURE: 116 MMHG

## 2021-12-13 DIAGNOSIS — K27.9 PUD (PEPTIC ULCER DISEASE): ICD-10-CM

## 2021-12-13 DIAGNOSIS — E83.52 HYPERCALCEMIA: ICD-10-CM

## 2021-12-13 DIAGNOSIS — K59.01 CONSTIPATION: ICD-10-CM

## 2021-12-13 DIAGNOSIS — Z90.49 S/P APPENDECTOMY: ICD-10-CM

## 2021-12-13 DIAGNOSIS — R10.9 ABDOMINAL PAIN: ICD-10-CM

## 2021-12-13 DIAGNOSIS — K31.84 GASTROPARESIS: ICD-10-CM

## 2021-12-13 DIAGNOSIS — R53.83 FATIGUE, UNSPECIFIED TYPE: ICD-10-CM

## 2021-12-13 DIAGNOSIS — K21.9 GERD: ICD-10-CM

## 2021-12-13 DIAGNOSIS — K29.70 GASTRITIS: ICD-10-CM

## 2021-12-13 DIAGNOSIS — R19.7 DIARRHEA, UNSPECIFIED TYPE: ICD-10-CM

## 2021-12-13 PROCEDURE — 99214 OFFICE O/P EST MOD 30 MIN: CPT | Performed by: INTERNAL MEDICINE

## 2021-12-13 RX ORDER — DEXLANSOPRAZOLE 60 MG/1
1 CAPSULE CAPSULE, DELAYED RELEASE ORAL ONCE A DAY
Qty: 90 | Status: ACTIVE | COMMUNITY
Start: 2020-06-03

## 2021-12-13 RX ORDER — PROMETHAZINE HYDROCHLORIDE 25 MG/1
1 SUPPOSITORY AS NEEDED SUPPOSITORY RECTAL
Qty: 60 | Refills: 2 | Status: ACTIVE | COMMUNITY
Start: 2020-12-28

## 2021-12-13 RX ORDER — DIAZEPAM 10 MG
TABLET ORAL
Qty: 0 | Refills: 0 | Status: ACTIVE | COMMUNITY
Start: 1900-01-01

## 2021-12-13 RX ORDER — PROMETHAZINE HYDROCHLORIDE 25 MG/1
TAKE ONE TABLET BY MOUTH EVERY 6 HOURS AS NEEDED FOR NAUSEA TABLET ORAL
Qty: 60 TABLET | Refills: 2 | Status: ACTIVE | COMMUNITY

## 2021-12-13 RX ORDER — GABAPENTIN 800 MG/1
TAKE 1 TABLET (800 MG) BY ORAL ROUTE 3 TIMES PER DAY TABLET, FILM COATED ORAL
Qty: 0 | Refills: 0 | Status: ACTIVE | COMMUNITY
Start: 1900-01-01

## 2021-12-13 RX ORDER — QUETIAPINE 50 MG/1
TABLET, FILM COATED ORAL
Qty: 0 | Refills: 0 | Status: ACTIVE | COMMUNITY
Start: 1900-01-01

## 2021-12-13 RX ORDER — METOCLOPRAMIDE HYDROCHLORIDE 5 MG/1
1 TABLET BEFORE MEALS TABLET ORAL
Qty: 180 | Refills: 1 | Status: ACTIVE | COMMUNITY
Start: 2021-02-15

## 2021-12-13 RX ORDER — PANTOPRAZOLE SODIUM 40 MG/1
TAKE 1 TABLET (40 MG) BY ORAL ROUTE ONCE DAILY FOR 90 DAYS TABLET, DELAYED RELEASE ORAL
Qty: 90 | Refills: 2 | Status: ACTIVE | COMMUNITY

## 2021-12-13 RX ORDER — PANTOPRAZOLE SODIUM 40 MG/1
TAKE ONE TABLET BY MOUTH DAILY TABLET, DELAYED RELEASE ORAL
Qty: 90 TABLET | Refills: 1 | Status: ACTIVE | COMMUNITY

## 2021-12-13 RX ORDER — POLYETHYLENE GLYCOL 3350 17 G/17G
USE AS DIRECTED BY ORAL ROUTE QD.  TAKE 2 CAPFULS DAILY AND CONSIDER GOING UP AS NEEDED POWDER, FOR SOLUTION ORAL
Qty: 1 | Refills: 4 | Status: ACTIVE | COMMUNITY

## 2021-12-13 RX ORDER — FAMOTIDINE 20 MG/1
1 TABLET AT BEDTIME AS NEEDED TABLET, FILM COATED ORAL BID PRN
Qty: 60 | Refills: 3 | Status: ACTIVE | COMMUNITY
Start: 2020-06-03

## 2021-12-13 RX ORDER — ONDANSETRON HYDROCHLORIDE 4 MG/1
TAKE 1 TABLET BY MOUTH THREE TIMES DAILY TABLET, FILM COATED ORAL
Qty: 270 | Refills: 0 | Status: ACTIVE | COMMUNITY

## 2021-12-13 NOTE — HPI-TODAY'S VISIT:
pt comes in for follow up for gastroparesis last seen in 11/2021 she was well last time with improved symptoms of pain with meditation she is tolerating high level of tube feeds and eating two meal per day she is down 5 lb from last visit still no nausea or vomiting not requiring to vent her G tube and not needing phenergan mainly complains of fatigue primarily she is up to 80cc/hr of the higher calories feeds labs last time with high calcium she has reduced a stool softner which has calcium.  she is on magnesium pills for constipation no overt gi bleed  no other complaints  6/2021 She is presenting today with gastroparesis associated with nausea and vomiting. last seen in 3/2021  had peg/j placed in 4/2021  improved until 2 weeks ago when she went to the hospital for appendicitis had appendectomy with drain placed she comes in today worsening symptoms of nausea was tolerating j tube feeds at 45 cc/hr has had to decrease it in the past 2 weeks after her appendectomy assoc abd paini assoc night sweats and nausea notes loose stools with diarrhea was on pain meds for her appendectomy but ran out now.    prior hx of gastroparesis.   She has been reluctant to try reglan and other medications for her gastroparesis due to side effects like HTN, and tardive dyskinesia.  now with PEG/J for venting and feeding nothing by mouth  prior constipation well controlled on miralax now off meds due to diarrhea  prior hx of gastritis and opioid induced constipation egd in 10/2019 with large food bezoar in the stomach.  repeat egd in 12/2019 with gastritis but no hp or celiac went to [facility] ER on 3/21/2020 with unremarkable and was given stool softener EGD in 5/2014 with noted ulcer and patient was placed on protonix and bentyl. egd in 2017 with gastritis but no ulcers. A colonoscopy in 12/2015 with normal ti/colon biopsies. No new complaints.

## 2021-12-13 NOTE — PHYSICAL EXAM GASTROINTESTINAL
Abdomen , soft, nontender, nondistended , no guarding or rigidity , no masses palpable , normal bowel sounds , PEG/J site is clean and dry. Liver and Spleen , no hepatomegaly present , no hepatosplenomegaly , liver nontender , spleen not palpable

## 2021-12-14 LAB
BUN/CREATININE RATIO: 17
BUN: 15
CARBON DIOXIDE, TOTAL: 22
CHLORIDE: 101
CREATININE: 0.9
EGFR IF AFRICN AM: 93
EGFR IF NONAFRICN AM: 81
GLUCOSE: 113
HEMATOCRIT: 43.7
HEMOGLOBIN: 14.6
MCH: 30.5
MCHC: 33.4
MCV: 91
NRBC: (no result)
PLATELETS: 409
POTASSIUM: 4.4
PREALBUMIN: 31
RBC: 4.78
RDW: 11.7
SODIUM: 141
WBC: 11

## 2022-01-06 ENCOUNTER — TELEPHONE ENCOUNTER (OUTPATIENT)
Dept: URBAN - METROPOLITAN AREA CLINIC 80 | Facility: CLINIC | Age: 42
End: 2022-01-06

## 2022-01-06 RX ORDER — LINACLOTIDE 145 UG/1
1 CAPSULE AT LEAST 30 MINUTES BEFORE THE FIRST MEAL OF THE DAY ON AN EMPTY STOMACH CAPSULE, GELATIN COATED ORAL ONCE A DAY
Qty: 90 | Refills: 3 | OUTPATIENT
Start: 2022-01-11 | End: 2023-01-06

## 2022-02-11 ENCOUNTER — OFFICE VISIT (OUTPATIENT)
Dept: URBAN - METROPOLITAN AREA TELEHEALTH 2 | Facility: TELEHEALTH | Age: 42
End: 2022-02-11
Payer: MEDICARE

## 2022-02-11 VITALS — BODY MASS INDEX: 25.71 KG/M2 | WEIGHT: 179.6 LBS | HEIGHT: 70 IN

## 2022-02-11 DIAGNOSIS — K21.9 GERD: ICD-10-CM

## 2022-02-11 DIAGNOSIS — K59.01 CONSTIPATION: ICD-10-CM

## 2022-02-11 DIAGNOSIS — K27.9 PUD (PEPTIC ULCER DISEASE): ICD-10-CM

## 2022-02-11 DIAGNOSIS — R10.84 ABDOMINAL CRAMPING, GENERALIZED: ICD-10-CM

## 2022-02-11 PROCEDURE — 99213 OFFICE O/P EST LOW 20 MIN: CPT | Performed by: INTERNAL MEDICINE

## 2022-02-11 RX ORDER — PROMETHAZINE HYDROCHLORIDE 25 MG/1
TAKE ONE TABLET BY MOUTH EVERY 6 HOURS AS NEEDED FOR NAUSEA TABLET ORAL
Qty: 60 TABLET | Refills: 2 | Status: ACTIVE | COMMUNITY

## 2022-02-11 RX ORDER — PANTOPRAZOLE SODIUM 40 MG/1
TAKE 1 TABLET (40 MG) BY ORAL ROUTE ONCE DAILY FOR 90 DAYS TABLET, DELAYED RELEASE ORAL
Qty: 90 | Refills: 2 | Status: ACTIVE | COMMUNITY

## 2022-02-11 RX ORDER — QUETIAPINE 50 MG/1
TABLET, FILM COATED ORAL
Qty: 0 | Refills: 0 | Status: ACTIVE | COMMUNITY
Start: 1900-01-01

## 2022-02-11 RX ORDER — DIAZEPAM 10 MG
TABLET ORAL
Qty: 0 | Refills: 0 | Status: ACTIVE | COMMUNITY
Start: 1900-01-01

## 2022-02-11 RX ORDER — POLYETHYLENE GLYCOL 3350 17 G/17G
USE AS DIRECTED BY ORAL ROUTE QD.  TAKE 2 CAPFULS DAILY AND CONSIDER GOING UP AS NEEDED POWDER, FOR SOLUTION ORAL
Qty: 1 | Refills: 4 | Status: ACTIVE | COMMUNITY

## 2022-02-11 RX ORDER — GABAPENTIN 800 MG/1
TAKE 1 TABLET (800 MG) BY ORAL ROUTE 3 TIMES PER DAY TABLET, FILM COATED ORAL
Qty: 0 | Refills: 0 | Status: ACTIVE | COMMUNITY
Start: 1900-01-01

## 2022-02-11 RX ORDER — PROMETHAZINE HYDROCHLORIDE 25 MG/1
1 SUPPOSITORY AS NEEDED SUPPOSITORY RECTAL
Qty: 60 | Refills: 2 | Status: ACTIVE | COMMUNITY
Start: 2020-12-28

## 2022-02-11 RX ORDER — METOCLOPRAMIDE HYDROCHLORIDE 5 MG/1
1 TABLET BEFORE MEALS TABLET ORAL
Qty: 180 | Refills: 1 | Status: ACTIVE | COMMUNITY
Start: 2021-02-15

## 2022-02-11 RX ORDER — PANTOPRAZOLE SODIUM 40 MG/1
TAKE ONE TABLET BY MOUTH DAILY TABLET, DELAYED RELEASE ORAL
Qty: 90 TABLET | Refills: 1 | Status: ACTIVE | COMMUNITY

## 2022-02-11 RX ORDER — LINACLOTIDE 145 UG/1
1 CAPSULE AT LEAST 30 MINUTES BEFORE THE FIRST MEAL OF THE DAY ON AN EMPTY STOMACH CAPSULE, GELATIN COATED ORAL ONCE A DAY
Qty: 90 | Refills: 3 | Status: ACTIVE | COMMUNITY
Start: 2022-01-11 | End: 2023-01-06

## 2022-02-11 RX ORDER — DEXLANSOPRAZOLE 60 MG/1
1 CAPSULE CAPSULE, DELAYED RELEASE ORAL ONCE A DAY
Qty: 90 | Status: ACTIVE | COMMUNITY
Start: 2020-06-03

## 2022-02-11 RX ORDER — ONDANSETRON HYDROCHLORIDE 4 MG/1
TAKE 1 TABLET BY MOUTH THREE TIMES DAILY TABLET, FILM COATED ORAL
Qty: 270 | Refills: 0 | Status: ACTIVE | COMMUNITY

## 2022-02-11 RX ORDER — FAMOTIDINE 20 MG/1
1 TABLET AT BEDTIME AS NEEDED TABLET, FILM COATED ORAL BID PRN
Qty: 60 | Refills: 3 | Status: ACTIVE | COMMUNITY
Start: 2020-06-03

## 2022-02-11 NOTE — HPI-TODAY'S VISIT:
pt previously seen for gastroparesis last seen in 12/2021 previoulsly found relief with meditation since last visit, has noted significant improvement in her symptoms her gastroparesis and n/v have improved dramatically  does try to eat on a regular frequency and workin elisa medication have been helpful she is eating now by mouth she using miralax to help ith regular bm no fever or chills.   taking her pills through the G tube weight is stable fatigue is improving also  she is introducing vegetables and fish primarily avoids processed food notes some abd crampns that she attributes to eating primarily and her body "getting used" to ti.   no other complaints  6/2021 She is presenting today with gastroparesis associated with nausea and vomiting. last seen in 3/2021  had peg/j placed in 4/2021  improved until 2 weeks ago when she went to the hospital for appendicitis had appendectomy with drain placed she comes in today worsening symptoms of nausea was tolerating j tube feeds at 45 cc/hr has had to decrease it in the past 2 weeks after her appendectomy assoc abd paini assoc night sweats and nausea notes loose stools with diarrhea was on pain meds for her appendectomy but ran out now.    prior hx of gastroparesis.   She has been reluctant to try reglan and other medications for her gastroparesis due to side effects like HTN, and tardive dyskinesia.  now with PEG/J for venting and feeding nothing by mouth  prior constipation well controlled on miralax now off meds due to diarrhea  prior hx of gastritis and opioid induced constipation egd in 10/2019 with large food bezoar in the stomach.  repeat egd in 12/2019 with gastritis but no hp or celiac  went to [facility] on 3/21/2020 with unremarkable and was given stool softener EGD in 5/2014 with noted ulcer and patient was placed on protonix and bentyl. egd in 2017 with gastritis but no ulcers. A colonoscopy in 12/2015 with normal ti/colon biopsies. No new complaints.

## 2022-03-28 ENCOUNTER — TELEPHONE ENCOUNTER (OUTPATIENT)
Dept: URBAN - METROPOLITAN AREA CLINIC 80 | Facility: CLINIC | Age: 42
End: 2022-03-28

## 2022-05-12 ENCOUNTER — TELEPHONE ENCOUNTER (OUTPATIENT)
Dept: URBAN - METROPOLITAN AREA CLINIC 80 | Facility: CLINIC | Age: 42
End: 2022-05-12

## 2022-06-06 ENCOUNTER — TELEPHONE ENCOUNTER (OUTPATIENT)
Dept: URBAN - METROPOLITAN AREA MEDICAL CENTER 18 | Facility: MEDICAL CENTER | Age: 42
End: 2022-06-06

## 2022-06-13 ENCOUNTER — OFFICE VISIT (OUTPATIENT)
Dept: URBAN - METROPOLITAN AREA CLINIC 80 | Facility: CLINIC | Age: 42
End: 2022-06-13
Payer: MEDICARE

## 2022-06-13 VITALS
BODY MASS INDEX: 24.05 KG/M2 | SYSTOLIC BLOOD PRESSURE: 110 MMHG | HEIGHT: 70 IN | DIASTOLIC BLOOD PRESSURE: 62 MMHG | WEIGHT: 168 LBS | TEMPERATURE: 97.3 F | HEART RATE: 80 BPM

## 2022-06-13 DIAGNOSIS — K29.70 GASTRITIS: ICD-10-CM

## 2022-06-13 DIAGNOSIS — K27.9 PUD (PEPTIC ULCER DISEASE): ICD-10-CM

## 2022-06-13 DIAGNOSIS — R10.9 ABDOMINAL PAIN: ICD-10-CM

## 2022-06-13 DIAGNOSIS — R53.83 FATIGUE, UNSPECIFIED TYPE: ICD-10-CM

## 2022-06-13 DIAGNOSIS — Z90.49 S/P APPENDECTOMY: ICD-10-CM

## 2022-06-13 DIAGNOSIS — K21.9 GERD: ICD-10-CM

## 2022-06-13 DIAGNOSIS — R19.7 DIARRHEA, UNSPECIFIED TYPE: ICD-10-CM

## 2022-06-13 DIAGNOSIS — K59.01 CONSTIPATION: ICD-10-CM

## 2022-06-13 DIAGNOSIS — E83.52 HYPERCALCEMIA: ICD-10-CM

## 2022-06-13 DIAGNOSIS — K31.84 GASTROPARESIS: ICD-10-CM

## 2022-06-13 PROCEDURE — 99214 OFFICE O/P EST MOD 30 MIN: CPT | Performed by: INTERNAL MEDICINE

## 2022-06-13 RX ORDER — ONDANSETRON HYDROCHLORIDE 4 MG/1
TAKE 1 TABLET BY MOUTH THREE TIMES DAILY TABLET, FILM COATED ORAL
Qty: 270 | Refills: 0 | Status: ACTIVE | COMMUNITY

## 2022-06-13 RX ORDER — LINACLOTIDE 145 UG/1
1 CAPSULE AT LEAST 30 MINUTES BEFORE THE FIRST MEAL OF THE DAY ON AN EMPTY STOMACH CAPSULE, GELATIN COATED ORAL ONCE A DAY
Qty: 90 | Refills: 3 | Status: DISCONTINUED | COMMUNITY
Start: 2022-01-11 | End: 2023-01-06

## 2022-06-13 RX ORDER — PANTOPRAZOLE SODIUM 40 MG/1
TAKE 1 TABLET (40 MG) BY ORAL ROUTE ONCE DAILY FOR 90 DAYS TABLET, DELAYED RELEASE ORAL
Qty: 90 | Refills: 2 | Status: ACTIVE | COMMUNITY

## 2022-06-13 RX ORDER — DEXLANSOPRAZOLE 60 MG/1
1 CAPSULE CAPSULE, DELAYED RELEASE ORAL ONCE A DAY
Qty: 90 | Status: ACTIVE | COMMUNITY
Start: 2020-06-03

## 2022-06-13 RX ORDER — PANTOPRAZOLE SODIUM 40 MG/1
TAKE ONE TABLET BY MOUTH DAILY TABLET, DELAYED RELEASE ORAL
Qty: 90 TABLET | Refills: 1 | Status: DISCONTINUED | COMMUNITY

## 2022-06-13 RX ORDER — QUETIAPINE 50 MG/1
TABLET, FILM COATED ORAL
Qty: 0 | Refills: 0 | Status: ACTIVE | COMMUNITY
Start: 1900-01-01

## 2022-06-13 RX ORDER — GABAPENTIN 800 MG/1
TAKE 1 TABLET (800 MG) BY ORAL ROUTE 3 TIMES PER DAY TABLET, FILM COATED ORAL
Qty: 0 | Refills: 0 | Status: DISCONTINUED | COMMUNITY
Start: 1900-01-01

## 2022-06-13 RX ORDER — FAMOTIDINE 20 MG/1
1 TABLET AT BEDTIME AS NEEDED TABLET, FILM COATED ORAL BID PRN
Qty: 60 | Refills: 3 | Status: DISCONTINUED | COMMUNITY
Start: 2020-06-03

## 2022-06-13 RX ORDER — PROMETHAZINE HYDROCHLORIDE 25 MG/1
TAKE ONE TABLET BY MOUTH EVERY 6 HOURS AS NEEDED FOR NAUSEA TABLET ORAL
Qty: 60 TABLET | Refills: 2 | Status: ACTIVE | COMMUNITY

## 2022-06-13 RX ORDER — DIAZEPAM 10 MG
TABLET ORAL
Qty: 0 | Refills: 0 | Status: ACTIVE | COMMUNITY
Start: 1900-01-01

## 2022-06-13 RX ORDER — PROMETHAZINE HYDROCHLORIDE 25 MG/1
1 SUPPOSITORY AS NEEDED SUPPOSITORY RECTAL
Qty: 60 | Refills: 2 | Status: ACTIVE | COMMUNITY
Start: 2020-12-28

## 2022-06-13 RX ORDER — ONDANSETRON HYDROCHLORIDE 4 MG/1
TAKE 1 TABLET BY MOUTH THREE TIMES DAILY TABLET, FILM COATED ORAL
Qty: 270 | Refills: 0

## 2022-06-13 RX ORDER — POLYETHYLENE GLYCOL 3350 17 G/17G
USE AS DIRECTED BY ORAL ROUTE QD.  TAKE 2 CAPFULS DAILY AND CONSIDER GOING UP AS NEEDED POWDER, FOR SOLUTION ORAL
Qty: 1 | Refills: 4 | Status: ACTIVE | COMMUNITY

## 2022-06-13 RX ORDER — FAMOTIDINE 20 MG/1
1 TABLET AS NEEDED TABLET, FILM COATED ORAL
Qty: 180 | Refills: 2 | OUTPATIENT
Start: 2022-06-13

## 2022-06-13 RX ORDER — METOCLOPRAMIDE HYDROCHLORIDE 5 MG/1
1 TABLET BEFORE MEALS TABLET ORAL
Qty: 180 | Refills: 1 | Status: DISCONTINUED | COMMUNITY
Start: 2021-02-15

## 2022-06-13 NOTE — HPI-TODAY'S VISIT:
pt previously seen for gastroparesis last seen in 2/2022 had improved at last visit and in the interim saw IR and had PEG tube removed reflux has been well controlled on pantoprazole but recently stopped it  has  breakthrough heartburn  1-2 times per week uses prn protonix no dysphagia mild nausea but no vomiting responds to zofran.  uses phenergan randomly for refractory gerd notes constipatoin but relates it to bentyl has stopped it with improved bm on bid magnesium has linzess at home but does not take it no fever or chills energy improved. weight is down 9 lb but is eating better continues to avoid processed food and fried foods  no other complaints  2/2022: previoulsly found relief with meditation since last visit, has noted significant improvement in her symptoms her gastroparesis and n/v have improved dramatically  does try to eat on a regular frequency and workin elisa medication have been helpful she is eating now by mouth she using miralax to help ith regular bm no fever or chills.   taking her pills through the G tube weight is stable fatigue is improving also  she is introducing vegetables and fish primarily avoids processed food notes some abd crampns that she attributes to eating primarily and her body "getting used" to ti.   no other complaints  6/2021 She is presenting today with gastroparesis associated with nausea and vomiting. last seen in 3/2021  had peg/j placed in 4/2021  improved until 2 weeks ago when she went to the hospital for appendicitis had appendectomy with drain placed she comes in today worsening symptoms of nausea was tolerating j tube feeds at 45 cc/hr has had to decrease it in the past 2 weeks after her appendectomy assoc abd paini assoc night sweats and nausea notes loose stools with diarrhea was on pain meds for her appendectomy but ran out now.    prior hx of gastroparesis.   She has been reluctant to try reglan and other medications for her gastroparesis due to side effects like HTN, and tardive dyskinesia.  now with PEG/J for venting and feeding nothing by mouth  prior constipation well controlled on miralax now off meds due to diarrhea  prior hx of gastritis and opioid induced constipation egd in 10/2019 with large food bezoar in the stomach.  repeat egd in 12/2019 with gastritis but no hp or celiac  went to [facility] on 3/21/2020 with unremarkable and was given stool softener EGD in 5/2014 with noted ulcer and patient was placed on protonix and bentyl. egd in 2017 with gastritis but no ulcers. A colonoscopy in 12/2015 with normal ti/colon biopsies. No new complaints.

## 2022-06-13 NOTE — PHYSICAL EXAM GASTROINTESTINAL
Abdomen , soft, nontender, nondistended , no guarding or rigidity , no masses palpable , normal bowel sounds , PEG site is well healed.  central indentation but no oozing.  Liver and Spleen , no hepatomegaly present , no hepatosplenomegaly , liver nontender , spleen not palpable

## 2022-08-03 ENCOUNTER — TELEPHONE ENCOUNTER (OUTPATIENT)
Dept: URBAN - METROPOLITAN AREA CLINIC 80 | Facility: CLINIC | Age: 42
End: 2022-08-03

## 2022-08-08 ENCOUNTER — OFFICE VISIT (OUTPATIENT)
Dept: URBAN - METROPOLITAN AREA CLINIC 80 | Facility: CLINIC | Age: 42
End: 2022-08-08
Payer: MEDICARE

## 2022-08-08 VITALS
SYSTOLIC BLOOD PRESSURE: 122 MMHG | BODY MASS INDEX: 22.65 KG/M2 | WEIGHT: 158.2 LBS | HEART RATE: 106 BPM | TEMPERATURE: 98.5 F | HEIGHT: 70 IN | DIASTOLIC BLOOD PRESSURE: 70 MMHG

## 2022-08-08 DIAGNOSIS — K31.84 GASTROPARESIS: ICD-10-CM

## 2022-08-08 DIAGNOSIS — K21.9 GERD: ICD-10-CM

## 2022-08-08 DIAGNOSIS — K27.9 PUD (PEPTIC ULCER DISEASE): ICD-10-CM

## 2022-08-08 DIAGNOSIS — R63.4 ABNORMAL WEIGHT LOSS: ICD-10-CM

## 2022-08-08 DIAGNOSIS — E83.52 HYPERCALCEMIA: ICD-10-CM

## 2022-08-08 DIAGNOSIS — R53.83 FATIGUE, UNSPECIFIED TYPE: ICD-10-CM

## 2022-08-08 DIAGNOSIS — R10.9 ABDOMINAL PAIN: ICD-10-CM

## 2022-08-08 DIAGNOSIS — Z90.49 S/P APPENDECTOMY: ICD-10-CM

## 2022-08-08 DIAGNOSIS — K29.70 GASTRITIS: ICD-10-CM

## 2022-08-08 DIAGNOSIS — R19.7 DIARRHEA, UNSPECIFIED TYPE: ICD-10-CM

## 2022-08-08 DIAGNOSIS — K59.01 CONSTIPATION: ICD-10-CM

## 2022-08-08 PROCEDURE — 99214 OFFICE O/P EST MOD 30 MIN: CPT | Performed by: INTERNAL MEDICINE

## 2022-08-08 RX ORDER — ONDANSETRON HYDROCHLORIDE 4 MG/1
TAKE 1 TABLET BY MOUTH THREE TIMES DAILY TABLET, FILM COATED ORAL
Qty: 270 | Refills: 0 | Status: ACTIVE | COMMUNITY

## 2022-08-08 RX ORDER — FAMOTIDINE 20 MG/1
1 TABLET AS NEEDED TABLET, FILM COATED ORAL
Qty: 180 | Refills: 2 | Status: ACTIVE | COMMUNITY
Start: 2022-06-13

## 2022-08-08 RX ORDER — SODIUM SULFATE, MAGNESIUM SULFATE, AND POTASSIUM CHLORIDE 17.75; 2.7; 2.25 G/1; G/1; G/1
12 TABLETS TABLET ORAL
Qty: 24 TABLETS | Refills: 0 | OUTPATIENT
Start: 2022-08-10 | End: 2022-08-11

## 2022-08-08 RX ORDER — DEXLANSOPRAZOLE 60 MG/1
1 CAPSULE CAPSULE, DELAYED RELEASE ORAL ONCE A DAY
Qty: 90 | Status: ACTIVE | COMMUNITY
Start: 2020-06-03

## 2022-08-08 RX ORDER — POLYETHYLENE GLYCOL 3350 17 G/17G
AS DIRECTED POWDER, FOR SOLUTION ORAL ONCE A DAY
Qty: 90 | Refills: 3 | OUTPATIENT
Start: 2022-08-10 | End: 2023-08-05

## 2022-08-08 RX ORDER — PROMETHAZINE HYDROCHLORIDE 25 MG/1
TAKE ONE TABLET BY MOUTH EVERY 6 HOURS AS NEEDED FOR NAUSEA TABLET ORAL
Qty: 60 TABLET | Refills: 2 | Status: ON HOLD | COMMUNITY

## 2022-08-08 RX ORDER — QUETIAPINE 50 MG/1
TABLET, FILM COATED ORAL
Qty: 0 | Refills: 0 | Status: ACTIVE | COMMUNITY
Start: 1900-01-01

## 2022-08-08 RX ORDER — POLYETHYLENE GLYCOL 3350 17 G/17G
USE AS DIRECTED BY ORAL ROUTE QD.  TAKE 2 CAPFULS DAILY AND CONSIDER GOING UP AS NEEDED POWDER, FOR SOLUTION ORAL
Qty: 1 | Refills: 4 | Status: ACTIVE | COMMUNITY

## 2022-08-08 RX ORDER — PROMETHAZINE HYDROCHLORIDE 25 MG/1
1 SUPPOSITORY AS NEEDED SUPPOSITORY RECTAL
Qty: 60 | Refills: 2 | Status: ACTIVE | COMMUNITY
Start: 2020-12-28

## 2022-08-08 RX ORDER — DIAZEPAM 10 MG
TABLET ORAL
Qty: 0 | Refills: 0 | Status: ACTIVE | COMMUNITY
Start: 1900-01-01

## 2022-08-08 RX ORDER — PANTOPRAZOLE SODIUM 40 MG/1
TAKE 1 TABLET (40 MG) BY ORAL ROUTE ONCE DAILY FOR 90 DAYS TABLET, DELAYED RELEASE ORAL
Qty: 90 | Refills: 2 | Status: ACTIVE | COMMUNITY

## 2022-08-08 NOTE — HPI-TODAY'S VISIT:
pt previously seen for gastroparesis and constipation last seen in 6/2022 she returns for follow up  she comes in feeling much better previously had  worsening constipation on miralax and linzess dosing increased to 290mcg which was not helpful she resumed miralax which works for her notes daily soft bm with miralax uses senna-s for occ straining/constipation no bleeding notes normal appetite and eating well down another 10 lb from last visit without obvious cause  otherwise admits to normal energy no n/v no dysphagia  no other complaints  6/2022 had improved at last visit and in the interim saw IR and had PEG tube removed reflux has been well controlled on pantoprazole but recently stopped it  has  breakthrough heartburn  1-2 times per week uses prn protonix no dysphagia mild nausea but no vomiting responds to zofran.  uses phenergan randomly for refractory gerd notes constipatoin but relates it to bentyl has stopped it with improved bm on bid magnesium has linzess at home but does not take it no fever or chills energy improved. weight is down 9 lb but is eating better continues to avoid processed food and fried foods  no other complaints  2/2022: previoulsly found relief with meditation since last visit, has noted significant improvement in her symptoms her gastroparesis and n/v have improved dramatically  does try to eat on a regular frequency and workin elisa medication have been helpful she is eating now by mouth she using miralax to help ith regular bm no fever or chills.   taking her pills through the G tube weight is stable fatigue is improving also  she is introducing vegetables and fish primarily avoids processed food notes some abd crampns that she attributes to eating primarily and her body "getting used" to ti.   no other complaints  6/2021 She is presenting today with gastroparesis associated with nausea and vomiting. last seen in 3/2021  had peg/j placed in 4/2021  improved until 2 weeks ago when she went to the hospital for appendicitis had appendectomy with drain placed she comes in today worsening symptoms of nausea was tolerating j tube feeds at 45 cc/hr has had to decrease it in the past 2 weeks after her appendectomy assoc abd paini assoc night sweats and nausea notes loose stools with diarrhea was on pain meds for her appendectomy but ran out now.    prior hx of gastroparesis.   She has been reluctant to try reglan and other medications for her gastroparesis due to side effects like HTN, and tardive dyskinesia.  now with PEG/J for venting and feeding nothing by mouth  prior constipation well controlled on miralax now off meds due to diarrhea  prior hx of gastritis and opioid induced constipation egd in 10/2019 with large food bezoar in the stomach.  repeat egd in 12/2019 with gastritis but no hp or celiac  went to [facility] on 3/21/2020 with unremarkable and was given stool softener   EGD in 5/2014 with noted ulcer and patient was placed on protonix and bentyl. egd in 2017 with gastritis but no ulcers. A colonoscopy in 12/2015 with normal ti/colon biopsies. No new complaints.

## 2022-08-10 ENCOUNTER — WEB ENCOUNTER (OUTPATIENT)
Dept: URBAN - METROPOLITAN AREA CLINIC 80 | Facility: CLINIC | Age: 42
End: 2022-08-10

## 2022-08-10 ENCOUNTER — OFFICE VISIT (OUTPATIENT)
Dept: URBAN - METROPOLITAN AREA TELEHEALTH 2 | Facility: TELEHEALTH | Age: 42
End: 2022-08-10
Payer: MEDICARE

## 2022-08-10 DIAGNOSIS — R63.4 WEIGHT LOSS: ICD-10-CM

## 2022-08-10 DIAGNOSIS — K31.84 GASTROPARESIS: ICD-10-CM

## 2022-08-10 PROBLEM — 4556007 GASTRITIS: Status: ACTIVE | Noted: 2021-03-08

## 2022-08-10 PROBLEM — 428251008: Status: ACTIVE | Noted: 2021-06-28

## 2022-08-10 PROBLEM — 66931009: Status: ACTIVE | Noted: 2021-12-13

## 2022-08-10 PROBLEM — 235595009 GASTROESOPHAGEAL REFLUX DISEASE: Status: ACTIVE | Noted: 2021-03-08

## 2022-08-10 PROBLEM — 13200003 PEPTIC ULCER DISEASE: Status: ACTIVE | Noted: 2021-03-08

## 2022-08-10 PROBLEM — 14760008 CONSTIPATION: Status: ACTIVE | Noted: 2021-03-08

## 2022-08-10 PROCEDURE — 97802 MEDICAL NUTRITION INDIV IN: CPT | Performed by: DIETITIAN, REGISTERED

## 2022-08-10 RX ORDER — PROMETHAZINE HYDROCHLORIDE 25 MG/1
TAKE ONE TABLET BY MOUTH EVERY 6 HOURS AS NEEDED FOR NAUSEA TABLET ORAL
Qty: 60 TABLET | Refills: 2 | Status: ON HOLD | COMMUNITY

## 2022-08-10 RX ORDER — PANTOPRAZOLE SODIUM 40 MG/1
TAKE 1 TABLET (40 MG) BY ORAL ROUTE ONCE DAILY FOR 90 DAYS TABLET, DELAYED RELEASE ORAL
Qty: 90 | Refills: 2 | Status: ACTIVE | COMMUNITY

## 2022-08-10 RX ORDER — POLYETHYLENE GLYCOL 3350 17 G/17G
AS DIRECTED POWDER, FOR SOLUTION ORAL ONCE A DAY
Qty: 90 | Refills: 3
Start: 2022-08-10 | End: 2023-08-07

## 2022-08-10 RX ORDER — DIAZEPAM 10 MG
TABLET ORAL
Qty: 0 | Refills: 0 | Status: ACTIVE | COMMUNITY
Start: 1900-01-01

## 2022-08-10 RX ORDER — QUETIAPINE 50 MG/1
TABLET, FILM COATED ORAL
Qty: 0 | Refills: 0 | Status: ACTIVE | COMMUNITY
Start: 1900-01-01

## 2022-08-10 RX ORDER — DEXLANSOPRAZOLE 60 MG/1
1 CAPSULE CAPSULE, DELAYED RELEASE ORAL ONCE A DAY
Qty: 90 | Status: ACTIVE | COMMUNITY
Start: 2020-06-03

## 2022-08-10 RX ORDER — ONDANSETRON HYDROCHLORIDE 4 MG/1
TAKE 1 TABLET BY MOUTH THREE TIMES DAILY TABLET, FILM COATED ORAL
Qty: 270 | Refills: 0 | Status: ACTIVE | COMMUNITY

## 2022-08-10 RX ORDER — FAMOTIDINE 20 MG/1
1 TABLET AS NEEDED TABLET, FILM COATED ORAL
Qty: 180 | Refills: 2 | Status: ACTIVE | COMMUNITY
Start: 2022-06-13

## 2022-08-10 RX ORDER — POLYETHYLENE GLYCOL 3350 17 G/17G
USE AS DIRECTED BY ORAL ROUTE QD.  TAKE 2 CAPFULS DAILY AND CONSIDER GOING UP AS NEEDED POWDER, FOR SOLUTION ORAL
Qty: 1 | Refills: 4 | Status: ACTIVE | COMMUNITY

## 2022-08-10 RX ORDER — PROMETHAZINE HYDROCHLORIDE 25 MG/1
1 SUPPOSITORY AS NEEDED SUPPOSITORY RECTAL
Qty: 60 | Refills: 2 | Status: ACTIVE | COMMUNITY
Start: 2020-12-28

## 2022-08-10 RX ORDER — POLYETHYLENE GLYCOL 3350 17 G/17G
AS DIRECTED POWDER, FOR SOLUTION ORAL ONCE A DAY
Qty: 90 | Refills: 3 | Status: ACTIVE | COMMUNITY
Start: 2022-08-10 | End: 2023-08-05

## 2022-08-12 ENCOUNTER — TELEPHONE ENCOUNTER (OUTPATIENT)
Dept: URBAN - METROPOLITAN AREA CLINIC 80 | Facility: CLINIC | Age: 42
End: 2022-08-12

## 2022-08-12 PROBLEM — 235675006: Status: ACTIVE | Noted: 2021-03-08

## 2022-08-12 RX ORDER — POLYETHYLENE GLYCOL 3350 17 G/17G
AS DIRECTED POWDER, FOR SOLUTION ORAL ONCE A DAY
Qty: 90 | Refills: 3
Start: 2022-08-10 | End: 2023-08-07

## 2022-08-29 ENCOUNTER — WEB ENCOUNTER (OUTPATIENT)
Dept: URBAN - METROPOLITAN AREA CLINIC 80 | Facility: CLINIC | Age: 42
End: 2022-08-29

## 2022-08-29 RX ORDER — POLYETHYLENE GLYCOL 3350 17 G/17G
AS DIRECTED POWDER, FOR SOLUTION ORAL ONCE A DAY
Qty: 90 | Refills: 3
Start: 2022-08-10 | End: 2023-08-25

## 2022-09-27 ENCOUNTER — CLAIMS CREATED FROM THE CLAIM WINDOW (OUTPATIENT)
Dept: URBAN - METROPOLITAN AREA CLINIC 4 | Facility: CLINIC | Age: 42
End: 2022-09-27
Payer: MEDICARE

## 2022-09-27 ENCOUNTER — TELEPHONE ENCOUNTER (OUTPATIENT)
Dept: URBAN - METROPOLITAN AREA CLINIC 92 | Facility: CLINIC | Age: 42
End: 2022-09-27

## 2022-09-27 ENCOUNTER — OFFICE VISIT (OUTPATIENT)
Dept: URBAN - METROPOLITAN AREA SURGERY CENTER 19 | Facility: SURGERY CENTER | Age: 42
End: 2022-09-27
Payer: MEDICARE

## 2022-09-27 DIAGNOSIS — K22.89 DILATATION OF ESOPHAGUS: ICD-10-CM

## 2022-09-27 DIAGNOSIS — K29.70 GASTRITIS, UNSPECIFIED, WITHOUT BLEEDING: ICD-10-CM

## 2022-09-27 DIAGNOSIS — K63.89 OTHER SPECIFIED DISEASES OF INTESTINE: ICD-10-CM

## 2022-09-27 DIAGNOSIS — K22.89 OTHER SPECIFIED DISEASE OF ESOPHAGUS: ICD-10-CM

## 2022-09-27 DIAGNOSIS — K31.89 OTHER DISEASES OF STOMACH AND DUODENUM: ICD-10-CM

## 2022-09-27 DIAGNOSIS — K31.89 ACQUIRED DEFORMITY OF DUODENUM: ICD-10-CM

## 2022-09-27 DIAGNOSIS — R63.4 ABNORMAL INTENTIONAL WEIGHT LOSS: ICD-10-CM

## 2022-09-27 DIAGNOSIS — K63.5 BENIGN COLON POLYP: ICD-10-CM

## 2022-09-27 PROCEDURE — 45380 COLONOSCOPY AND BIOPSY: CPT | Performed by: INTERNAL MEDICINE

## 2022-09-27 PROCEDURE — 43239 EGD BIOPSY SINGLE/MULTIPLE: CPT | Performed by: INTERNAL MEDICINE

## 2022-09-27 PROCEDURE — 88305 TISSUE EXAM BY PATHOLOGIST: CPT | Performed by: PATHOLOGY

## 2022-09-27 PROCEDURE — G8907 PT DOC NO EVENTS ON DISCHARG: HCPCS | Performed by: INTERNAL MEDICINE

## 2022-09-27 PROCEDURE — 88312 SPECIAL STAINS GROUP 1: CPT | Performed by: PATHOLOGY

## 2022-09-27 RX ORDER — POLYETHYLENE GLYCOL 3350 17 G/17G
USE AS DIRECTED BY ORAL ROUTE QD.  TAKE 2 CAPFULS DAILY AND CONSIDER GOING UP AS NEEDED POWDER, FOR SOLUTION ORAL
Qty: 1 | Refills: 4 | Status: ACTIVE | COMMUNITY

## 2022-09-27 RX ORDER — DIAZEPAM 10 MG
TABLET ORAL
Qty: 0 | Refills: 0 | Status: ACTIVE | COMMUNITY
Start: 1900-01-01

## 2022-09-27 RX ORDER — PROMETHAZINE HYDROCHLORIDE 25 MG/1
1 SUPPOSITORY AS NEEDED SUPPOSITORY RECTAL
Qty: 60 | Refills: 2 | Status: ACTIVE | COMMUNITY
Start: 2020-12-28

## 2022-09-27 RX ORDER — PANTOPRAZOLE SODIUM 40 MG/1
TAKE 1 TABLET (40 MG) BY ORAL ROUTE ONCE DAILY FOR 90 DAYS TABLET, DELAYED RELEASE ORAL
Qty: 90 | Refills: 2 | Status: ACTIVE | COMMUNITY

## 2022-09-27 RX ORDER — ONDANSETRON HYDROCHLORIDE 4 MG/1
TAKE 1 TABLET BY MOUTH THREE TIMES DAILY TABLET, FILM COATED ORAL
Qty: 270 | Refills: 0 | Status: ACTIVE | COMMUNITY

## 2022-09-27 RX ORDER — PROMETHAZINE HYDROCHLORIDE 25 MG/1
TAKE ONE TABLET BY MOUTH EVERY 6 HOURS AS NEEDED FOR NAUSEA TABLET ORAL
Qty: 60 TABLET | Refills: 2 | Status: ON HOLD | COMMUNITY

## 2022-09-27 RX ORDER — DEXLANSOPRAZOLE 60 MG/1
1 CAPSULE CAPSULE, DELAYED RELEASE ORAL ONCE A DAY
Qty: 90 | Status: ACTIVE | COMMUNITY
Start: 2020-06-03

## 2022-09-27 RX ORDER — QUETIAPINE 50 MG/1
TABLET, FILM COATED ORAL
Qty: 0 | Refills: 0 | Status: ACTIVE | COMMUNITY
Start: 1900-01-01

## 2022-09-27 RX ORDER — FAMOTIDINE 20 MG/1
1 TABLET AS NEEDED TABLET, FILM COATED ORAL
Qty: 180 | Refills: 2 | Status: ACTIVE | COMMUNITY
Start: 2022-06-13

## 2022-09-27 RX ORDER — POLYETHYLENE GLYCOL 3350 17 G/17G
AS DIRECTED POWDER, FOR SOLUTION ORAL ONCE A DAY
Qty: 90 | Refills: 3 | Status: ACTIVE | COMMUNITY
Start: 2022-08-10 | End: 2023-08-25

## 2022-10-03 ENCOUNTER — ERX REFILL RESPONSE (OUTPATIENT)
Dept: URBAN - METROPOLITAN AREA CLINIC 80 | Facility: CLINIC | Age: 42
End: 2022-10-03

## 2022-10-03 RX ORDER — ONDANSETRON HYDROCHLORIDE 4 MG/1
TAKE 1 TABLET BY MOUTH THREE TIMES DAILY TABLET, FILM COATED ORAL
Qty: 270 | Refills: 0 | OUTPATIENT

## 2022-10-03 RX ORDER — ONDANSETRON 4 MG/1
TAKE 1 TABLET BY MOUTH THREE TIMES DAILY TABLET, FILM COATED ORAL
Qty: 270 TABLET | Refills: 0 | OUTPATIENT

## 2022-10-04 ENCOUNTER — WEB ENCOUNTER (OUTPATIENT)
Dept: URBAN - METROPOLITAN AREA CLINIC 80 | Facility: CLINIC | Age: 42
End: 2022-10-04

## 2022-10-04 RX ORDER — ONDANSETRON 4 MG/1
TAKE 1 TABLET BY MOUTH THREE TIMES DAILY AS NEEDED FOR NAUSEA TABLET, FILM COATED ORAL
Qty: 270 | Refills: 0

## 2022-10-12 ENCOUNTER — TELEPHONE ENCOUNTER (OUTPATIENT)
Dept: URBAN - METROPOLITAN AREA CLINIC 80 | Facility: CLINIC | Age: 42
End: 2022-10-12

## 2022-10-19 ENCOUNTER — OFFICE VISIT (OUTPATIENT)
Dept: URBAN - METROPOLITAN AREA CLINIC 79 | Facility: CLINIC | Age: 42
End: 2022-10-19
Payer: MEDICARE

## 2022-10-19 VITALS — HEIGHT: 70 IN

## 2022-10-19 DIAGNOSIS — K92.2: ICD-10-CM

## 2022-10-19 PROCEDURE — 91110 GI TRC IMG INTRAL ESOPH-ILE: CPT | Performed by: INTERNAL MEDICINE

## 2022-10-19 RX ORDER — PROMETHAZINE HYDROCHLORIDE 25 MG/1
TAKE ONE TABLET BY MOUTH EVERY 6 HOURS AS NEEDED FOR NAUSEA TABLET ORAL
Qty: 60 TABLET | Refills: 2 | Status: ON HOLD | COMMUNITY

## 2022-10-19 RX ORDER — PROMETHAZINE HYDROCHLORIDE 25 MG/1
1 SUPPOSITORY AS NEEDED SUPPOSITORY RECTAL
Qty: 60 | Refills: 2 | Status: ACTIVE | COMMUNITY
Start: 2020-12-28

## 2022-10-19 RX ORDER — QUETIAPINE 50 MG/1
TABLET, FILM COATED ORAL
Qty: 0 | Refills: 0 | Status: ACTIVE | COMMUNITY
Start: 1900-01-01

## 2022-10-19 RX ORDER — ONDANSETRON 4 MG/1
TAKE 1 TABLET BY MOUTH THREE TIMES DAILY AS NEEDED FOR NAUSEA TABLET, FILM COATED ORAL
Qty: 270 | Refills: 0 | Status: ACTIVE | COMMUNITY

## 2022-10-19 RX ORDER — DIAZEPAM 10 MG
TABLET ORAL
Qty: 0 | Refills: 0 | Status: ACTIVE | COMMUNITY
Start: 1900-01-01

## 2022-10-19 RX ORDER — DEXLANSOPRAZOLE 60 MG/1
1 CAPSULE CAPSULE, DELAYED RELEASE ORAL ONCE A DAY
Qty: 90 | Status: ACTIVE | COMMUNITY
Start: 2020-06-03

## 2022-10-19 RX ORDER — PANTOPRAZOLE SODIUM 40 MG/1
TAKE 1 TABLET (40 MG) BY ORAL ROUTE ONCE DAILY FOR 90 DAYS TABLET, DELAYED RELEASE ORAL
Qty: 90 | Refills: 2 | Status: ACTIVE | COMMUNITY

## 2022-10-19 RX ORDER — POLYETHYLENE GLYCOL 3350 17 G/17G
USE AS DIRECTED BY ORAL ROUTE QD.  TAKE 2 CAPFULS DAILY AND CONSIDER GOING UP AS NEEDED POWDER, FOR SOLUTION ORAL
Qty: 1 | Refills: 4 | Status: ACTIVE | COMMUNITY

## 2022-10-19 RX ORDER — FAMOTIDINE 20 MG/1
1 TABLET AS NEEDED TABLET, FILM COATED ORAL
Qty: 180 | Refills: 2 | Status: ACTIVE | COMMUNITY
Start: 2022-06-13

## 2022-10-19 RX ORDER — POLYETHYLENE GLYCOL 3350 17 G/17G
AS DIRECTED POWDER, FOR SOLUTION ORAL ONCE A DAY
Qty: 90 | Refills: 3 | Status: ACTIVE | COMMUNITY
Start: 2022-08-10 | End: 2023-08-25

## 2022-10-20 ENCOUNTER — OFFICE VISIT (OUTPATIENT)
Dept: URBAN - METROPOLITAN AREA CLINIC 79 | Facility: CLINIC | Age: 42
End: 2022-10-20

## 2023-04-14 ENCOUNTER — ERX REFILL RESPONSE (OUTPATIENT)
Dept: URBAN - METROPOLITAN AREA CLINIC 80 | Facility: CLINIC | Age: 43
End: 2023-04-14

## 2023-04-14 RX ORDER — PROMETHAZINE HYDROCHLORIDE 25 MG/1
1 SUPPOSITORY AS NEEDED SUPPOSITORY RECTAL
Qty: 60 | Refills: 2 | OUTPATIENT

## 2023-04-14 RX ORDER — PROMETHAZINE HYDROCHLORIDE 25 MG/1
TAKE ONE TABLET BY MOUTH EVERY 6 HOURS AS NEEDED FOR NAUSEA TABLET ORAL
Qty: 60 TABLET | Refills: 0 | OUTPATIENT

## 2023-04-23 ENCOUNTER — WEB ENCOUNTER (OUTPATIENT)
Dept: URBAN - METROPOLITAN AREA CLINIC 80 | Facility: CLINIC | Age: 43
End: 2023-04-23

## 2023-04-26 ENCOUNTER — OFFICE VISIT (OUTPATIENT)
Dept: URBAN - METROPOLITAN AREA CLINIC 80 | Facility: CLINIC | Age: 43
End: 2023-04-26
Payer: MEDICARE

## 2023-04-26 VITALS — TEMPERATURE: 97.1 F | WEIGHT: 174.2 LBS | BODY MASS INDEX: 24.94 KG/M2 | HEIGHT: 70 IN

## 2023-04-26 DIAGNOSIS — K64.1 GRADE II HEMORRHOIDS: ICD-10-CM

## 2023-04-26 PROCEDURE — 99213 OFFICE O/P EST LOW 20 MIN: CPT | Performed by: INTERNAL MEDICINE

## 2023-04-26 PROCEDURE — 46221 LIGATION OF HEMORRHOID(S): CPT | Performed by: INTERNAL MEDICINE

## 2023-04-26 PROCEDURE — 46600 DIAGNOSTIC ANOSCOPY SPX: CPT | Performed by: INTERNAL MEDICINE

## 2023-04-26 RX ORDER — PANTOPRAZOLE SODIUM 40 MG/1
TAKE 1 TABLET (40 MG) BY ORAL ROUTE ONCE DAILY FOR 90 DAYS TABLET, DELAYED RELEASE ORAL
Qty: 90 | Refills: 2 | Status: DISCONTINUED | COMMUNITY

## 2023-04-26 RX ORDER — PROMETHAZINE HYDROCHLORIDE 25 MG/1
TAKE ONE TABLET BY MOUTH EVERY 6 HOURS AS NEEDED FOR NAUSEA TABLET ORAL
Qty: 60 TABLET | Refills: 0 | Status: ACTIVE | COMMUNITY

## 2023-04-26 RX ORDER — POLYETHYLENE GLYCOL 3350 17 G/17G
USE AS DIRECTED BY ORAL ROUTE QD.  TAKE 2 CAPFULS DAILY AND CONSIDER GOING UP AS NEEDED POWDER, FOR SOLUTION ORAL
Qty: 1 | Refills: 4 | Status: ACTIVE | COMMUNITY

## 2023-04-26 RX ORDER — ONDANSETRON 4 MG/1
TAKE 1 TABLET BY MOUTH THREE TIMES DAILY AS NEEDED FOR NAUSEA TABLET, FILM COATED ORAL
Qty: 270 | Refills: 0 | Status: ACTIVE | COMMUNITY

## 2023-04-26 RX ORDER — FAMOTIDINE 20 MG/1
1 TABLET AT BEDTIME AS NEEDED TABLET, FILM COATED ORAL ONCE A DAY
Status: ACTIVE | COMMUNITY

## 2023-04-26 RX ORDER — FAMOTIDINE 20 MG/1
1 TABLET AS NEEDED TABLET, FILM COATED ORAL
Qty: 180 | Refills: 2 | Status: DISCONTINUED | COMMUNITY
Start: 2022-06-13

## 2023-04-26 RX ORDER — POLYETHYLENE GLYCOL 3350 17 G/17G
AS DIRECTED POWDER, FOR SOLUTION ORAL ONCE A DAY
Qty: 90 | Refills: 3 | Status: ACTIVE | COMMUNITY
Start: 2022-08-10 | End: 2023-08-25

## 2023-04-26 RX ORDER — DIAZEPAM 10 MG
TABLET ORAL
Qty: 0 | Refills: 0 | Status: ACTIVE | COMMUNITY
Start: 1900-01-01

## 2023-04-26 RX ORDER — DEXLANSOPRAZOLE 60 MG/1
1 CAPSULE CAPSULE, DELAYED RELEASE ORAL ONCE A DAY
Qty: 90 | Status: DISCONTINUED | COMMUNITY
Start: 2020-06-03

## 2023-04-26 RX ORDER — QUETIAPINE 50 MG/1
TABLET, FILM COATED ORAL
Qty: 0 | Refills: 0 | Status: ACTIVE | COMMUNITY
Start: 1900-01-01

## 2023-04-26 NOTE — PHYSICAL EXAM GASTROINTESTINAL
Abdomen , soft, nontender, nondistended , no guarding or rigidity , no masses palpable , normal bowel sounds , Liver and Spleen , no hepatomegaly present , no hepatosplenomegaly , liver nontender , spleen not palpable , Rectal , normal sphincter tone , no skin tags.  reji with no rectal masses or bleeding present.  anoscopy with grade II IH.  right > left.  s/p banding of right posteior hemorrhoid today.  Joann Ansari MA present as chaperone

## 2023-04-26 NOTE — HPI-TODAY'S VISIT:
pt previously seen for gastroparesis and constipation  last seen in 8/2022 seen today for issues related to hemorrhoids s/p colonoscopy in 9/27/2022 with 3mm polyp removed and internal hemorrhoids  continues to do well compared to last year her gastroparesis is well controlled off meds she notes rare nausea but mostly eats well and is gaining weight stools with rare constipation on linzess 290mcg mostly complains of rectal bleeding with some rectal pressure no abd pain eating well stable weight.   no other complaints  6/2022 had improved at last visit and in the interim saw IR and had PEG tube removed reflux has been well controlled on pantoprazole but recently stopped it  has  breakthrough heartburn  1-2 times per week uses prn protonix no dysphagia mild nausea but no vomiting responds to zofran.  uses phenergan randomly for refractory gerd notes constipatoin but relates it to bentyl has stopped it with improved bm on bid magnesium has linzess at home but does not take it no fever or chills energy improved. weight is down 9 lb but is eating better continues to avoid processed food and fried foods  no other complaints  2/2022: previoulsly found relief with meditation since last visit, has noted significant improvement in her symptoms her gastroparesis and n/v have improved dramatically  does try to eat on a regular frequency and workin elisa medication have been helpful she is eating now by mouth she using miralax to help ith regular bm no fever or chills.   taking her pills through the G tube weight is stable fatigue is improving also  she is introducing vegetables and fish primarily avoids processed food notes some abd crampns that she attributes to eating primarily and her body "getting used" to ti.   no other complaints  6/2021 She is presenting today with gastroparesis associated with nausea and vomiting. last seen in 3/2021  had peg/j placed in 4/2021  improved until 2 weeks ago when she went to the hospital for appendicitis had appendectomy with drain placed she comes in today worsening symptoms of nausea was tolerating j tube feeds at 45 cc/hr has had to decrease it in the past 2 weeks after her appendectomy assoc abd paini assoc night sweats and nausea notes loose stools with diarrhea was on pain meds for her appendectomy but ran out now.    prior hx of gastroparesis.   She has been reluctant to try reglan and other medications for her gastroparesis due to side effects like HTN, and tardive dyskinesia.  now with PEG/J for venting and feeding nothing by mouth  prior constipation well controlled on miralax now off meds due to diarrhea  prior hx of gastritis and opioid induced constipation egd in 10/2019 with large food bezoar in the stomach.  repeat egd in 12/2019 with gastritis but no hp or celiac  went to [facility] on 3/21/2020 with unremarkable and was given stool softener   EGD in 5/2014 with noted ulcer and patient was placed on protonix and bentyl. egd in 2017 with gastritis but no ulcers. A colonoscopy in 12/2015 with normal ti/colon biopsies. No new complaints.

## 2023-04-27 ENCOUNTER — WEB ENCOUNTER (OUTPATIENT)
Dept: URBAN - METROPOLITAN AREA CLINIC 80 | Facility: CLINIC | Age: 43
End: 2023-04-27

## 2023-04-27 RX ORDER — TRAMADOL HYDROCHLORIDE 50 MG/1
1 TABLET AS NEEDED TABLET, FILM COATED ORAL
Qty: 28 | Refills: 0 | OUTPATIENT
Start: 2023-05-01 | End: 2023-05-08

## 2023-04-28 ENCOUNTER — TELEPHONE ENCOUNTER (OUTPATIENT)
Dept: URBAN - METROPOLITAN AREA CLINIC 6 | Facility: CLINIC | Age: 43
End: 2023-04-28

## 2023-04-28 RX ORDER — DICYCLOMINE HYDROCHLORIDE 20 MG/1
1 TABLET TABLET ORAL THREE TIMES A DAY
Qty: 90 | OUTPATIENT
Start: 2023-04-28 | End: 2023-05-28

## 2023-05-02 PROBLEM — 428283002: Status: ACTIVE | Noted: 2023-05-02

## 2023-05-15 ENCOUNTER — OFFICE VISIT (OUTPATIENT)
Dept: URBAN - METROPOLITAN AREA CLINIC 80 | Facility: CLINIC | Age: 43
End: 2023-05-15
Payer: MEDICARE

## 2023-05-15 VITALS — BODY MASS INDEX: 24.65 KG/M2 | HEIGHT: 70 IN | WEIGHT: 172.2 LBS | TEMPERATURE: 97.9 F

## 2023-05-15 DIAGNOSIS — K64.1 GRADE II HEMORRHOIDS: ICD-10-CM

## 2023-05-15 DIAGNOSIS — E83.52 HYPERCALCEMIA: ICD-10-CM

## 2023-05-15 DIAGNOSIS — K31.84 GASTROPARESIS: ICD-10-CM

## 2023-05-15 DIAGNOSIS — K21.9 GERD: ICD-10-CM

## 2023-05-15 DIAGNOSIS — R53.83 FATIGUE, UNSPECIFIED TYPE: ICD-10-CM

## 2023-05-15 DIAGNOSIS — K27.9 PUD (PEPTIC ULCER DISEASE): ICD-10-CM

## 2023-05-15 DIAGNOSIS — K59.01 CONSTIPATION: ICD-10-CM

## 2023-05-15 DIAGNOSIS — Z90.49 S/P APPENDECTOMY: ICD-10-CM

## 2023-05-15 DIAGNOSIS — K29.70 GASTRITIS: ICD-10-CM

## 2023-05-15 DIAGNOSIS — Z86.010 HISTORY OF COLON POLYPS: ICD-10-CM

## 2023-05-15 DIAGNOSIS — R63.4 ABNORMAL WEIGHT LOSS: ICD-10-CM

## 2023-05-15 DIAGNOSIS — R19.7 DIARRHEA, UNSPECIFIED TYPE: ICD-10-CM

## 2023-05-15 PROCEDURE — 46221 LIGATION OF HEMORRHOID(S): CPT | Performed by: INTERNAL MEDICINE

## 2023-05-15 PROCEDURE — 99213 OFFICE O/P EST LOW 20 MIN: CPT | Performed by: INTERNAL MEDICINE

## 2023-05-15 RX ORDER — PROMETHAZINE HYDROCHLORIDE 25 MG/1
TAKE ONE TABLET BY MOUTH EVERY 6 HOURS AS NEEDED FOR NAUSEA TABLET ORAL
Qty: 60 TABLET | Refills: 0 | Status: ACTIVE | COMMUNITY

## 2023-05-15 RX ORDER — POLYETHYLENE GLYCOL 3350 17 G/17G
AS DIRECTED POWDER, FOR SOLUTION ORAL ONCE A DAY
Qty: 90 | Refills: 3 | Status: ACTIVE | COMMUNITY
Start: 2022-08-10 | End: 2023-08-25

## 2023-05-15 RX ORDER — DIAZEPAM 10 MG
TABLET ORAL
Qty: 0 | Refills: 0 | Status: ACTIVE | COMMUNITY
Start: 1900-01-01

## 2023-05-15 RX ORDER — POLYETHYLENE GLYCOL 3350 17 G/17G
USE AS DIRECTED BY ORAL ROUTE QD.  TAKE 2 CAPFULS DAILY AND CONSIDER GOING UP AS NEEDED POWDER, FOR SOLUTION ORAL
Qty: 1 | Refills: 4 | Status: ACTIVE | COMMUNITY

## 2023-05-15 RX ORDER — ONDANSETRON 4 MG/1
TAKE 1 TABLET BY MOUTH THREE TIMES DAILY AS NEEDED FOR NAUSEA TABLET, FILM COATED ORAL
Qty: 270 | Refills: 0 | Status: ACTIVE | COMMUNITY

## 2023-05-15 RX ORDER — FAMOTIDINE 20 MG/1
1 TABLET AT BEDTIME AS NEEDED TABLET, FILM COATED ORAL ONCE A DAY
Status: ACTIVE | COMMUNITY

## 2023-05-15 RX ORDER — DICYCLOMINE HYDROCHLORIDE 20 MG/1
1 TABLET TABLET ORAL THREE TIMES A DAY
Qty: 90 | Status: ACTIVE | COMMUNITY
Start: 2023-04-28 | End: 2023-05-28

## 2023-05-15 RX ORDER — QUETIAPINE 50 MG/1
TABLET, FILM COATED ORAL
Qty: 0 | Refills: 0 | Status: ACTIVE | COMMUNITY
Start: 1900-01-01

## 2023-05-15 NOTE — PHYSICAL EXAM GASTROINTESTINAL
Abdomen , soft, nontender, nondistended , no guarding or rigidity , no masses palpable , normal bowel sounds , Liver and Spleen , no hepatomegaly present , no hepatosplenomegaly , liver nontender , spleen not palpable , Rectal , normal sphincter tone , no external hemorrhoids, rectal masses or bleeding present. s/p banding o left lateral hemorrhoid today.  Meghan Rivera MA present as chaperone.

## 2023-05-15 NOTE — HPI-TODAY'S VISIT:
pt previously seen for gastroparesis and constipation   last seen in 4/26/2023 for symptomatic hemorhroids s/p banding of right posteior hemorrhoid s/p colonoscopy in 9/27/2022 with 3mm polyp removed and internal hemorrhoids here for continued hemorrhoids  had improvment in bleeding but still seeing some had some pain after that lasted 2 days notes normal stools hx of constipation but doing well on linzess 290mcg otherwise still doing well gastroparsis is diet controlled with minimal nausea.  no vomitnig able to tolerate po and maintaining weight no abd pain also tring cbd oil with relief as well no n/v no dysphagia eating well  no other complaints  6/2022 had improved at last visit and in the interim saw IR and had PEG tube removed reflux has been well controlled on pantoprazole but recently stopped it  has  breakthrough heartburn  1-2 times per week uses prn protonix no dysphagia mild nausea but no vomiting responds to zofran.  uses phenergan randomly for refractory gerd notes constipatoin but relates it to bentyl has stopped it with improved bm on bid magnesium has linzess at home but does not take it no fever or chills energy improved. weight is down 9 lb but is eating better continues to avoid processed food and fried foods  no other complaints  had peg/j placed in 4/2021  had appendectomy with drain placed prior hx of gastritis and opioid induced constipation egd in 10/2019 with large food bezoar in the stomach.  repeat egd in 12/2019 with gastritis but no hp or celiac  went to [facility] ER on 3/21/2020 with unremarkable and was given stool softener   EGD in 5/2014 with noted ulcer and patient was placed on protonix and bentyl. egd in 2017 with gastritis but no ulcers. A colonoscopy in 12/2015 with normal ti/colon biopsies. No new complaints.

## 2023-05-26 ENCOUNTER — DASHBOARD ENCOUNTERS (OUTPATIENT)
Age: 43
End: 2023-05-26

## 2023-05-31 ENCOUNTER — ERX REFILL RESPONSE (OUTPATIENT)
Dept: URBAN - METROPOLITAN AREA CLINIC 80 | Facility: CLINIC | Age: 43
End: 2023-05-31

## 2023-05-31 RX ORDER — ONDANSETRON 4 MG/1
TAKE 1 TABLET BY MOUTH THREE TIMES DAILY AS NEEDED FOR NAUSEA TABLET, FILM COATED ORAL
Qty: 270 | Refills: 0 | OUTPATIENT

## 2023-05-31 RX ORDER — PROMETHAZINE HYDROCHLORIDE 25 MG/1
TAKE ONE TABLET BY MOUTH EVERY 6 HOURS AS NEEDED FOR NAUSEA TABLET ORAL
Qty: 60 TABLET | Refills: 0 | OUTPATIENT

## 2023-06-07 ENCOUNTER — OFFICE VISIT (OUTPATIENT)
Dept: URBAN - METROPOLITAN AREA CLINIC 80 | Facility: CLINIC | Age: 43
End: 2023-06-07
Payer: MEDICARE

## 2023-06-07 VITALS — TEMPERATURE: 97.4 F | HEIGHT: 70 IN | BODY MASS INDEX: 24.25 KG/M2 | WEIGHT: 169.4 LBS

## 2023-06-07 DIAGNOSIS — K21.9 GERD: ICD-10-CM

## 2023-06-07 DIAGNOSIS — K31.84 GASTROPARESIS: ICD-10-CM

## 2023-06-07 DIAGNOSIS — K27.9 PUD (PEPTIC ULCER DISEASE): ICD-10-CM

## 2023-06-07 DIAGNOSIS — E83.52 HYPERCALCEMIA: ICD-10-CM

## 2023-06-07 DIAGNOSIS — R53.83 FATIGUE, UNSPECIFIED TYPE: ICD-10-CM

## 2023-06-07 DIAGNOSIS — Z86.010 HISTORY OF COLON POLYPS: ICD-10-CM

## 2023-06-07 DIAGNOSIS — R63.4 ABNORMAL WEIGHT LOSS: ICD-10-CM

## 2023-06-07 DIAGNOSIS — K29.70 GASTRITIS: ICD-10-CM

## 2023-06-07 DIAGNOSIS — K59.01 CONSTIPATION: ICD-10-CM

## 2023-06-07 DIAGNOSIS — Z90.49 S/P APPENDECTOMY: ICD-10-CM

## 2023-06-07 DIAGNOSIS — R19.7 DIARRHEA, UNSPECIFIED TYPE: ICD-10-CM

## 2023-06-07 DIAGNOSIS — K64.1 GRADE II HEMORRHOIDS: ICD-10-CM

## 2023-06-07 PROCEDURE — 46221 LIGATION OF HEMORRHOID(S): CPT | Performed by: INTERNAL MEDICINE

## 2023-06-07 PROCEDURE — 99213 OFFICE O/P EST LOW 20 MIN: CPT | Performed by: INTERNAL MEDICINE

## 2023-06-07 RX ORDER — PROMETHAZINE HYDROCHLORIDE 25 MG/1
TAKE ONE TABLET BY MOUTH EVERY 6 HOURS AS NEEDED FOR NAUSEA TABLET ORAL
Qty: 60 TABLET | Refills: 0 | Status: ACTIVE | COMMUNITY

## 2023-06-07 RX ORDER — POLYETHYLENE GLYCOL 3350 17 G/17G
USE AS DIRECTED BY ORAL ROUTE QD.  TAKE 2 CAPFULS DAILY AND CONSIDER GOING UP AS NEEDED POWDER, FOR SOLUTION ORAL
Qty: 1 | Refills: 4 | Status: ACTIVE | COMMUNITY

## 2023-06-07 RX ORDER — ONDANSETRON 4 MG/1
TAKE 1 TABLET BY MOUTH THREE TIMES DAILY AS NEEDED FOR NAUSEA TABLET, FILM COATED ORAL
Qty: 270 | Refills: 0 | Status: ACTIVE | COMMUNITY

## 2023-06-07 RX ORDER — FAMOTIDINE 20 MG/1
1 TABLET AT BEDTIME AS NEEDED TABLET, FILM COATED ORAL ONCE A DAY
Status: ACTIVE | COMMUNITY

## 2023-06-07 RX ORDER — DIAZEPAM 10 MG
TABLET ORAL
Qty: 0 | Refills: 0 | Status: ACTIVE | COMMUNITY
Start: 1900-01-01

## 2023-06-07 RX ORDER — POLYETHYLENE GLYCOL 3350 17 G/17G
AS DIRECTED POWDER, FOR SOLUTION ORAL ONCE A DAY
Qty: 90 | Refills: 3 | Status: ACTIVE | COMMUNITY
Start: 2022-08-10 | End: 2023-08-25

## 2023-06-07 RX ORDER — QUETIAPINE 50 MG/1
TABLET, FILM COATED ORAL
Qty: 0 | Refills: 0 | Status: ACTIVE | COMMUNITY
Start: 1900-01-01

## 2023-06-07 NOTE — PHYSICAL EXAM GASTROINTESTINAL
Abdomen , soft, nontender, nondistended , no guarding or rigidity , no masses palpable , normal bowel sounds , Liver and Spleen , no hepatomegaly present , no hepatosplenomegaly , liver nontender , spleen not palpable , Rectal , normal sphincter tone , no external hemorrhoids, skin tags, rectal masses or bleeding present.  reji with no anal fisure.  s/p banding of right anterior hemorrhoid today.  TICO Ugalde present as chaperone.

## 2023-06-07 NOTE — HPI-TODAY'S VISIT:
pt previously seen for gastroparesis and constipation   last seen in 5/15/2023 for symptomatic hemorhroids and continued banding s/p banding of right posteior hemorrhoid in 4/26/2023 s/p banding of left lateral hemorrhoid in 5/15/2023 s/p colonoscopy in 9/27/2022 with 3mm polyp removed and internal hemorrhoids here for continued hemorrhoids  much improved still noting pain after banding requiring few days of tramadol improved symptoms over all having increased itching today but notes increase in bm with 3-4 formed stools ( bss of 3-4) no [place] diarrhea still uses toilet paper hx of constipation and was controlled on linzess 290mcg.  now not requiring any laxatives otherwise still doing well gastroparsis is diet controlled with minimal nausea but  no vomiting able to tolerate po and maintaining weight no abd pain using cbd oil with relief as well no dysphagia eating well  no other complaints  6/2022 had improved at last visit and in the interim saw IR and had PEG tube removed reflux has been well controlled on pantoprazole but recently stopped it  has  breakthrough heartburn  1-2 times per week uses prn protonix no dysphagia mild nausea but no vomiting responds to zofran.  uses phenergan randomly for refractory gerd notes constipatoin but relates it to bentyl has stopped it with improved bm on bid magnesium has linzess at home but does not take it no fever or chills energy improved. weight is down 9 lb but is eating better continues to avoid processed food and fried foods  no other complaints  had peg/j placed in 4/2021  had appendectomy with drain placed prior hx of gastritis and opioid induced constipation egd in 10/2019 with large food bezoar in the stomach.  repeat egd in 12/2019 with gastritis but no hp or celiac  went to [facility] ER on 3/21/2020 with unremarkable and was given stool softener   EGD in 5/2014 with noted ulcer and patient was placed on protonix and bentyl. egd in 2017 with gastritis but no ulcers. A colonoscopy in 12/2015 with normal ti/colon biopsies. No new complaints.

## 2023-06-12 ENCOUNTER — OFFICE VISIT (OUTPATIENT)
Dept: URBAN - METROPOLITAN AREA CLINIC 80 | Facility: CLINIC | Age: 43
End: 2023-06-12

## 2023-09-09 ENCOUNTER — WEB ENCOUNTER (OUTPATIENT)
Dept: URBAN - METROPOLITAN AREA CLINIC 80 | Facility: CLINIC | Age: 43
End: 2023-09-09

## 2023-09-28 ENCOUNTER — WEB ENCOUNTER (OUTPATIENT)
Dept: URBAN - METROPOLITAN AREA CLINIC 80 | Facility: CLINIC | Age: 43
End: 2023-09-28

## 2023-09-28 RX ORDER — FAMOTIDINE 20 MG/1
1 TABLET AT BEDTIME AS NEEDED TABLET, FILM COATED ORAL
Qty: 180 | Refills: 3

## 2023-09-28 RX ORDER — PROMETHAZINE HYDROCHLORIDE 25 MG/1
TAKE ONE TABLET BY MOUTH EVERY 6 HOURS AS NEEDED FOR NAUSEA TABLET ORAL
Qty: 60 TABLET | Refills: 0

## 2023-10-10 ENCOUNTER — WEB ENCOUNTER (OUTPATIENT)
Dept: URBAN - METROPOLITAN AREA CLINIC 80 | Facility: CLINIC | Age: 43
End: 2023-10-10

## 2023-10-11 ENCOUNTER — WEB ENCOUNTER (OUTPATIENT)
Dept: URBAN - METROPOLITAN AREA CLINIC 80 | Facility: CLINIC | Age: 43
End: 2023-10-11

## 2023-12-29 ENCOUNTER — WEB ENCOUNTER (OUTPATIENT)
Dept: URBAN - METROPOLITAN AREA CLINIC 80 | Facility: CLINIC | Age: 43
End: 2023-12-29

## 2023-12-29 RX ORDER — LINACLOTIDE 145 UG/1
1 CAPSULE AT LEAST 30 MINUTES BEFORE THE FIRST MEAL OF THE DAY ON AN EMPTY STOMACH CAPSULE, GELATIN COATED ORAL ONCE A DAY
Qty: 90 | Refills: 3 | OUTPATIENT
Start: 2024-01-03 | End: 2024-12-28